# Patient Record
Sex: MALE | Race: WHITE | Employment: FULL TIME | URBAN - METROPOLITAN AREA
[De-identification: names, ages, dates, MRNs, and addresses within clinical notes are randomized per-mention and may not be internally consistent; named-entity substitution may affect disease eponyms.]

---

## 2017-02-13 DIAGNOSIS — M62.838 MUSCLE SPASM: Primary | ICD-10-CM

## 2017-02-13 RX ORDER — CYCLOBENZAPRINE HCL 10 MG
10 TABLET ORAL 2 TIMES DAILY
Qty: 20 TAB | Refills: 0 | Status: SHIPPED | OUTPATIENT
Start: 2017-02-13 | End: 2017-10-26 | Stop reason: SDUPTHER

## 2017-02-13 NOTE — TELEPHONE ENCOUNTER
He has pulled a muscle in his back 2 weeks  Ago and can't get it to let go. He feels like he needs the flexeril for a couple of days.

## 2017-02-15 RX ORDER — CYCLOBENZAPRINE HCL 5 MG
5 TABLET ORAL
Qty: 20 TAB | Refills: 0 | OUTPATIENT
Start: 2017-02-15

## 2017-02-23 DIAGNOSIS — M10.00 IDIOPATHIC GOUT, UNSPECIFIED CHRONICITY, UNSPECIFIED SITE: ICD-10-CM

## 2017-02-23 RX ORDER — FEBUXOSTAT 40 MG/1
40 TABLET, FILM COATED ORAL DAILY
Qty: 90 TAB | Refills: 2 | Status: SHIPPED | OUTPATIENT
Start: 2017-02-23 | End: 2017-04-21 | Stop reason: SDUPTHER

## 2017-04-03 DIAGNOSIS — I10 ESSENTIAL HYPERTENSION WITH GOAL BLOOD PRESSURE LESS THAN 140/90: ICD-10-CM

## 2017-04-04 RX ORDER — NEBIVOLOL 5 MG/1
5 TABLET ORAL DAILY
Qty: 90 TAB | Refills: 2 | Status: SHIPPED | OUTPATIENT
Start: 2017-04-04

## 2017-04-21 DIAGNOSIS — M10.00 IDIOPATHIC GOUT, UNSPECIFIED CHRONICITY, UNSPECIFIED SITE: ICD-10-CM

## 2017-04-21 RX ORDER — FEBUXOSTAT 40 MG/1
40 TABLET, FILM COATED ORAL DAILY
Qty: 90 TAB | Refills: 3 | Status: SHIPPED | OUTPATIENT
Start: 2017-04-21 | End: 2017-11-20 | Stop reason: SDUPTHER

## 2017-07-26 ENCOUNTER — OFFICE VISIT (OUTPATIENT)
Dept: INTERNAL MEDICINE CLINIC | Age: 42
End: 2017-07-26

## 2017-07-26 VITALS
BODY MASS INDEX: 37.67 KG/M2 | TEMPERATURE: 98.1 F | RESPIRATION RATE: 15 BRPM | SYSTOLIC BLOOD PRESSURE: 132 MMHG | HEART RATE: 81 BPM | OXYGEN SATURATION: 95 % | HEIGHT: 73 IN | WEIGHT: 284.2 LBS | DIASTOLIC BLOOD PRESSURE: 84 MMHG

## 2017-07-26 DIAGNOSIS — E66.9 OBESITY (BMI 35.0-39.9 WITHOUT COMORBIDITY): Primary | ICD-10-CM

## 2017-07-26 DIAGNOSIS — E78.1 HYPERTRIGLYCERIDEMIA: ICD-10-CM

## 2017-07-26 RX ORDER — FENOFIBRATE 145 MG/1
TABLET, COATED ORAL
Refills: 3 | COMMUNITY
Start: 2017-05-30 | End: 2017-11-22 | Stop reason: SDUPTHER

## 2017-07-26 RX ORDER — PHENTERMINE HYDROCHLORIDE 37.5 MG/1
37.5 TABLET ORAL
Qty: 30 TAB | Refills: 0 | Status: SHIPPED | OUTPATIENT
Start: 2017-07-26 | End: 2018-04-04 | Stop reason: ALTCHOICE

## 2017-07-26 NOTE — MR AVS SNAPSHOT
Visit Information Date & Time Provider Department Dept. Phone Encounter #  
 7/26/2017  7:45 AM Anisha Dutton [facility] [phone] [id number] Your Appointments 8/18/2017  7:45 AM  
Any with Anisha Dutton NP [facility]) Appt Note: EKG [facility] [id number]  
[address] [facility] [address] [facility] [address] Upcoming Health Maintenance Date Due DTaP/Tdap/Td series (1 - Tdap) 7/29/1998 INFLUENZA AGE 9 TO ADULT 8/1/2017 Allergies as of 7/26/2017  Review Complete On: 7/26/2017 By: Anisha Dutton NP Severity Noted Reaction Type Reactions Pcn [Penicillins] High 08/19/2015   Systemic Hives, Shortness of Breath, Swelling Current Immunizations  Never Reviewed No immunizations on file. Not reviewed this visit You Were Diagnosed With   
  
 Codes Comments Obesity (BMI 35.0-39.9 without comorbidity)    -  Primary ICD-10-CM: D95.9 ICD-9-CM: 278.00 Hypertriglyceridemia     ICD-10-CM: E78.1 ICD-9-CM: 272.1 Vitals BP Pulse Temp Resp Height(growth percentile) Weight(growth percentile) 132/84 (BP 1 Location: Left arm, BP Patient Position: Sitting) 81 98.1 °F (36.7 °C) (Oral) 15 6' 1\" (1.854 m) 284 lb 3.2 oz (128.9 kg) SpO2 BMI Smoking Status 95% 37.5 kg/m2 Never Smoker BMI and BSA Data Body Mass Index Body Surface Area  
 37.5 kg/m 2 2.58 m 2 Preferred Pharmacy Pharmacy Name Phone [id number] [address] [facility] [address] [facility] AT [address] [place] [address] [phone] Your Updated Medication List  
  
   
This list is accurate as of: 7/26/17  8:30 AM.  Always use your most recent med list.  
  
  
  
  
 atorvastatin 20 mg tablet Commonly known as:  LIPITOR Take 1 Tab by mouth daily. b complex vitamins tablet Take 1 Tab by mouth daily. cholecalciferol 1,000 unit Cap Commonly known as:  VITAMIN D3 Take  by mouth daily. esomeprazole 40 mg capsule Commonly known as:  Marcel Moulds Take  by mouth daily. febuxostat 40 mg Tab tablet Commonly known as:  Isiah Running Take 1 Tab by mouth daily. fenofibrate nanocrystallized 145 mg tablet Commonly known as:  TRICOR  
TAKE 1 TABLET BY ORAL ROUTE EVERY DAY  
  
 nebivolol 5 mg tablet Commonly known as:  BYSTOLIC Take 1 Tab by mouth daily. omega-3 fatty acids-vitamin e 1,000 mg Cap Take 1 Cap by mouth.  
  
 phentermine 37.5 mg tablet Commonly known as:  ADIPEX-P Take 1 Tab by mouth every morning. Max Daily Amount: 37.5 mg.  
  
 red yeast rice extract 600 mg Cap Take 600 mg by mouth now. Prescriptions Printed Refills  
 phentermine (ADIPEX-P) 37.5 mg tablet 0 Sig: Take 1 Tab by mouth every morning. Max Daily Amount: 37.5 mg.  
 Class: Print Route: Oral  
  
Introducing [facility] SERVICES! Jany Gonzalez introduces [email or web address] patient portal. Now you can access parts of your medical record, email your doctor's office, and request medication refills online. 1. In your internet browser, go to [email or web address] 2. Click on the First Time User? Click Here link in the Sign In box. You will see the New Member Sign Up page. 3. Enter your [email or web address] Access Code exactly as it appears below. You will not need to use this code after youve completed the sign-up process. If you do not sign up before the expiration date, you must request a new code. · [email or web address] Access Code: [email or web address] Expires: 10/24/2017  8:30 AM 
 
4. Enter the last four digits of your Social Security Number (xxxx) and Date of Birth (mm/dd/yyyy) as indicated and click Submit. You will be taken to the next sign-up page. 5. Create a [employer]iy[employer] ID. This will be your [employer] login ID and cannot be changed, so think of one that is secure and easy to remember. 6. Create a [employer] password. You can change your password at any time. 7. Enter your Password Reset Question and Answer. This can be used at a later time if you forget your password. 8. Enter your e-mail address. You will receive e-mail notification when new information is available in [address] 9. Click Sign Up. You can now view and download portions of your medical record. 10. Click the Download Summary menu link to download a portable copy of your medical information. If you have questions, please visit the Frequently Asked Questions section of the [employer] website. Remember, [employer] is NOT to be used for urgent needs. For medical emergencies, dial 911. Now available from your iPhone and Android! Please provide this summary of care documentation to your next provider. Your primary care clinician is listed as Merryl Bence. If you have any questions after today's visit, please call [phone].

## 2017-07-26 NOTE — PROGRESS NOTES
HISTORY OF PRESENT ILLNESS  Tiffany Greco is a 46 y.o. male here to restart phentermine. Patient Active Problem List   Diagnosis Code    Gout M10.9    Hypertriglyceridemia E78.1    Idiopathic gout M10.00     Allergies   Allergen Reactions    Pcn [Penicillins] Hives, Shortness of Breath and Swelling     Current Outpatient Prescriptions on File Prior to Visit   Medication Sig Dispense Refill    febuxostat (ULORIC) 40 mg tab tablet Take 1 Tab by mouth daily. 90 Tab 3    nebivolol (BYSTOLIC) 5 mg tablet Take 1 Tab by mouth daily. 90 Tab 2    atorvastatin (LIPITOR) 20 mg tablet Take 1 Tab by mouth daily. 90 Tab 2    esomeprazole (NEXIUM) 40 mg capsule Take  by mouth daily.  cholecalciferol (VITAMIN D3) 1,000 unit cap Take  by mouth daily.  omega-3 fatty acids-vitamin e 1,000 mg cap Take 1 Cap by mouth.  red yeast rice extract 600 mg cap Take 600 mg by mouth now.  b complex vitamins tablet Take 1 Tab by mouth daily. No current facility-administered medications on file prior to visit. Past Medical History:   Diagnosis Date    Gout     Hypertension     Tachycardia     mild tachychardia with palpitations     Past Surgical History:   Procedure Laterality Date    HX SEPTOPLASTY       Social History     Social History    Marital status: UNKNOWN     Spouse name: N/A    Number of children: N/A    Years of education: N/A     Occupational History    Not on file. Social History Main Topics    Smoking status: Never Smoker    Smokeless tobacco: Never Used    Alcohol use Yes      Comment: occationally    Drug use: No    Sexual activity: Yes     Partners: Female     Birth control/ protection: Condom     Other Topics Concern    Not on file     Social History Narrative     Family History   Problem Relation Age of Onset    Diabetes Mother     Cancer Mother     Allergy-severe Mother      severe allergy to statins     This note will not be viewable in [address]     If Narcotics or controlled substances were prescribed, I personally reviewed the patient  history. HPI   Patient with history of obesity and high cholesterol presents requesting to restart phentermine for weight loss. He was successful a year ago with the medication. Per his report, cardiology is ok with his using the medication due to the high cholesterol and weight. Denies any additional concerns. No previous side effects with medication. Review of Systems   Constitutional: Negative for fever. HENT: Negative for congestion. Eyes: Negative for blurred vision and double vision. Respiratory: Negative for cough and shortness of breath. Cardiovascular: Negative for chest pain and palpitations. Gastrointestinal: Negative for abdominal pain, constipation, diarrhea, nausea and vomiting. Skin: Negative for rash. Neurological: Negative for dizziness and headaches. Psychiatric/Behavioral: Negative for depression. The patient is not nervous/anxious. Physical Exam   Constitutional: He is oriented to person, place, and time. Vital signs are normal. He appears well-developed. He is cooperative. He does not appear ill. No distress. Patient obese. Neck: Normal range of motion. Neck supple. Cardiovascular: Normal rate, regular rhythm and normal heart sounds. Pulmonary/Chest: Effort normal and breath sounds normal. No respiratory distress. Musculoskeletal: He exhibits no edema. Neurological: He is alert and oriented to person, place, and time. Skin: Skin is warm and dry. He is not diaphoretic. Psychiatric: He has a normal mood and affect. His behavior is normal.   Nursing note and vitals reviewed. ASSESSMENT and PLAN    ICD-10-CM ICD-9-CM    1. Obesity (BMI 35.0-39.9 without comorbidity) E66.9 278.00 phentermine (ADIPEX-P) 37.5 mg tablet   2. Hypertriglyceridemia E78.1 272.1    Phentermine prescription provided to patient.  Medication benefits, risks, indication, dosage, potential side effects and alternate medication options were discussed with patient who expressed understanding. He will return in 4 weeks and we will obtain an EKG to rule out cardiac changes. If normal, will proceed with monthly refills for 3 months. Patient instructed to stop medication and report to the ER for any chest pain, palpitations, shortness of breath, syncope or visual changes. Patient expressed understanding of instructions and agreement with treatment plan.

## 2017-07-26 NOTE — PATIENT INSTRUCTIONS
Thank you for choosing [facility]. We know you have many options when it comes to your healthcare; we appreciate that you picked us. Our goal is to provide exceptional  service and world class care for every patient. You may receive a survey in the mail or by email asking for your feedback. Please take a few minutes to share your thoughts about your recent visit. Your comments helps us understand what we do well and what we can do better. To ensure confidentiality, this survey is administered by an independent third-party, [address] [place] [address] participation will help us to improve the quality of care that we provide to you, your family, friends, and neighbors. Thank you! Body Mass Index: Care Instructions  Your Care Instructions    Body mass index (BMI) can help you see if your weight is raising your risk for health problems. It uses a formula to compare how much you weigh with how tall you are. · A BMI lower than 18.5 is considered underweight. · A BMI between 18.5 and 24.9 is considered healthy. · A BMI between 25 and 29.9 is considered overweight. A BMI of 30 or higher is considered obese. If your BMI is in the normal range, it means that you have a lower risk for weight-related health problems. If your BMI is in the overweight or obese range, you may be at increased risk for weight-related health problems, such as high blood pressure, heart disease, stroke, arthritis or joint pain, and diabetes. If your BMI is in the underweight range, you may be at increased risk for health problems such as fatigue, lower protection (immunity) against illness, muscle loss, bone loss, hair loss, and hormone problems. BMI is just one measure of your risk for weight-related health problems. You may be at higher risk for health problems if you are not active, you eat an unhealthy diet, or you drink too much alcohol or use tobacco products.   Follow-up care is a key part of your treatment and safety. Be sure to make and go to all appointments, and call your doctor if you are having problems. It's also a good idea to know your test results and keep a list of the medicines you take. How can you care for yourself at home? · Practice healthy eating habits. This includes eating plenty of fruits, vegetables, whole grains, lean protein, and low-fat dairy. · If your doctor recommends it, get more exercise. Walking is a good choice. Bit by bit, increase the amount you walk every day. Try for at least 30 minutes on most days of the week. · Do not smoke. Smoking can increase your risk for health problems. If you need help quitting, talk to your doctor about stop-smoking programs and medicines. These can increase your chances of quitting for good. · Limit alcohol to 2 drinks a day for men and 1 drink a day for women. Too much alcohol can cause health problems. If you have a BMI higher than 25  · Your doctor may do other tests to check your risk for weight-related health problems. This may include measuring the distance around your waist. A waist measurement of more than 40 inches in men or 35 inches in women can increase the risk of weight-related health problems. · Talk with your doctor about steps you can take to stay healthy or improve your health. You may need to make lifestyle changes to lose weight and stay healthy, such as changing your diet and getting regular exercise. If you have a BMI lower than 18.5  · Your doctor may do other tests to check your risk for health problems. · Talk with your doctor about steps you can take to stay healthy or improve your health. You may need to make lifestyle changes to gain or maintain weight and stay healthy, such as getting more healthy foods in your diet and doing exercises to build muscle. Where can you learn more? Go to http://gila-nathaly.info/.   Enter S176 in the search box to learn more about \"Body Mass Index: Care Instructions. \"  Current as of: January 23, 2017  Content Version: 11.3  © 5997-1163 [employer]. Care instructions adapted under license by [employer] (which disclaims liability or warranty for this information). If you have questions about a medical condition or this instruction, always ask your healthcare professional. [address] any warranty or liability for your use of this information. High Cholesterol: Care Instructions  Your Care Instructions  Cholesterol is a type of fat in your blood. It is needed for many body functions, such as making new cells. Cholesterol is made by your body. It also comes from food you eat. High cholesterol means that you have too much of the fat in your blood. This raises your risk of a heart attack and stroke. LDL and HDL are part of your total cholesterol. LDL is the \"bad\" cholesterol. High LDL can raise your risk for heart disease, heart attack, and stroke. HDL is the \"good\" cholesterol. It helps clear bad cholesterol from the body. High HDL is linked with a lower risk of heart disease, heart attack, and stroke. Your cholesterol levels help your doctor find out your risk for having a heart attack or stroke. You and your doctor can talk about whether you need to lower your risk and what treatment is best for you. A heart-healthy lifestyle along with medicines can help lower your cholesterol and your risk. The way you choose to lower your risk will depend on how high your risk is for heart attack and stroke. It will also depend on how you feel about taking medicines. Follow-up care is a key part of your treatment and safety. Be sure to make and go to all appointments, and call your doctor if you are having problems. It's also a good idea to know your test results and keep a list of the medicines you take. How can you care for yourself at home? · Eat a variety of foods every day.  Good choices include fruits, vegetables, whole grains (like oatmeal), dried beans and peas, nuts and seeds, soy products (like tofu), and fat-free or low-fat dairy products. · Replace butter, margarine, and hydrogenated or partially hydrogenated oils with olive and canola oils. (Canola oil margarine without trans fat is fine.)  · Replace red meat with fish, poultry, and soy protein (like tofu). · Limit processed and packaged foods like chips, crackers, and cookies. · Bake, broil, or steam foods. Don't carrasco them. · Be physically active. Get at least 30 minutes of exercise on most days of the week. Walking is a good choice. You also may want to do other activities, such as running, swimming, cycling, or playing tennis or team sports. · Stay at a healthy weight or lose weight by making the changes in eating and physical activity listed above. Losing just a small amount of weight, even 5 to 10 pounds, can reduce your risk for having a heart attack or stroke. · Do not smoke. When should you call for help? Watch closely for changes in your health, and be sure to contact your doctor if:  · You need help making lifestyle changes. · You have questions about your medicine. Where can you learn more? Go to http://gila-nathaly.info/. Enter W895 in the search box to learn more about \"High Cholesterol: Care Instructions. \"  Current as of: April 3, 2017  Content Version: 11.3  © 5460-9485 [employer]. Care instructions adapted under license by [employer] (which disclaims liability or warranty for this information). If you have questions about a medical condition or this instruction, always ask your healthcare professional. [place] [address] any warranty or liability for your use of this information. Learning About High Cholesterol  What is high cholesterol? Cholesterol is a type of fat in your blood. It is needed for many body functions, such as making new cells.  Cholesterol is made by your body. It also comes from food you eat. If you have too much cholesterol, it starts to build up in your arteries. This is called hardening of the arteries, or atherosclerosis. High cholesterol raises your risk of a heart attack and stroke. There are different types of cholesterol. LDL is the \"bad\" cholesterol. High LDL can raise your risk for heart disease, heart attack, and stroke. HDL is the \"good\" cholesterol. High HDL is linked with a lower risk for heart disease, heart attack, and stroke. Your cholesterol levels help your doctor find out your risk for having a heart attack or stroke. How can you prevent high cholesterol? A heart-healthy lifestyle can help you prevent high cholesterol. This lifestyle helps lower your risk for a heart attack and stroke. · Eat heart-healthy foods. ¨ Eat fruits, vegetables, whole grains (like oatmeal), dried beans and peas, nuts and seeds, soy products (like tofu), and fat-free or low-fat dairy products. ¨ Replace butter, margarine, and hydrogenated or partially hydrogenated oils with olive and canola oils. (Canola oil margarine without trans fat is fine.)  ¨ Replace red meat with fish, poultry, and soy protein (like tofu). ¨ Limit processed and packaged foods like chips, crackers, and cookies. · Be active. Exercise can improve your cholesterol level. Get at least 30 minutes of exercise on most days of the week. Walking is a good choice. You also may want to do other activities, such as running, swimming, cycling, or playing tennis or team sports. · Stay at a healthy weight. Lose weight if you need to. · Don't smoke. If you need help quitting, talk to your doctor about stop-smoking programs and medicines. These can increase your chances of quitting for good. How is high cholesterol treated? The goal of treatment is to reduce your chances of having a heart attack or stroke. The goal is not to lower your cholesterol numbers only.   · You may make lifestyle changes, such as eating healthy foods, not smoking, losing weight, and being more active. · You may have to take medicine. Follow-up care is a key part of your treatment and safety. Be sure to make and go to all appointments, and call your doctor if you are having problems. It's also a good idea to know your test results and keep a list of the medicines you take. Where can you learn more? Go to http://gila-nathaly.info/. Enter E778 in the search box to learn more about \"Learning About High Cholesterol. \"  Current as of: April 3, 2017  Content Version: 11.3  © 9749-5936 [employer]. Care instructions adapted under license by [employer] (which disclaims liability or warranty for this information). If you have questions about a medical condition or this instruction, always ask your healthcare professional. [address] any warranty or liability for your use of this information. Learning About Obesity  What is obesity? Obesity means having so much body fat that your health is in danger. Having too much body fat can lead to type 2 diabetes, heart disease, high blood pressure, arthritis, sleep apnea, and stroke. Even if you don't feel bad now, think about these health risks. Do they seem like a good reason to start on a new path toward a healthier weight? Or do you have another personal, powerful reason for wanting to lose weight? Whatever it is, keep it in mind. It can be hard to change eating habits and exercise habits. But with your own reason and plan, you can do it. How do you know if your weight is in the obesity range? To know if your weight is in the obesity range, your doctor looks at your body mass index (BMI) and waist size. Your BMI is a number that is calculated from your weight and your height.  To figure your BMI for yourself, get a BMI table from your doctor or use an online tool, such as http://www.[place]/ on the [employer] of [employer]. What causes obesity? When you take in more calories than you burn off, you gain weight. How you eat, how active you are, and other things affect how your body uses calories and whether you gain weight. If you have family members who have too much body fat, you may have inherited a tendency to gain weight. And your family also helps form your eating and lifestyle habits, which can lead to obesity. Also, our busy lives make it harder to plan and cook healthy meals. For many of us, it's easier to reach for prepared foods, go out to eat, or go to the drive-through. But these foods are often high in saturated fat and calories. Portions are often too large. What can you do to reach a healthy weight? Focus on health, not diets. Diets are hard to stay on and don't work in the long run. It is very hard to stay with a diet that includes lots of big changes in your eating habits. Instead of a diet, focus on lifestyle changes that will improve your health and achieve the right balance of energy and calories. To lose weight, you need to burn more calories than you take in. You can do it by eating healthy foods in reasonable amounts and becoming more active, even a little bit every day. Making small changes over time can add up to a lot. Make a plan for change. Many people have found that naming their reasons for change and staying focused on their plan can make a big difference. Work with your doctor to create a plan that is right for you. · Ask yourself: Dhaval Romero are my personal, most powerful reasons for wanting this change? What will my life look like when I've made the change? \"  · Set your long-term goal. Make it specific, such as \"I will lose x pounds. \"  · Break your long-term goal into smaller, short-term goals. Make these small steps specific and within your reach, things you know you can do.  These steps are what keep you going from day to day. How can you stay on your plan for change? Be ready. Choose to start during a time when there are few events that might trigger slip-ups, like holidays, social events, and high-stress periods. Decide on your first few steps. Most people have more success when they make small changes, one step at a time. For example, you might switch a daily candy bar to a piece of fruit, walk 10 minutes more, or add more vegetables to a meal.  Line up your support people. Make sure you're not going to be alone as you make this change. Connect with people who understand how important it is to you. Ask family members and friends for help in keeping with your plan. And think about who could make it harder for you, and how to handle them. Try tracking. People who keep track of what they eat, feel, and do are better at losing weight. Try writing down things like:  · What and how much you eat. · How you feel before and after each meal.  · Details about each meal (like eating out or at home, eating alone, or with friends or family). · What you do to be active. Look and plan. As you track, look for patterns that you may want to change. Take note of:  · When you eat and whether you skip meals. · How often you eat out. · How many fruits and vegetables you eat. · When you eat beyond feeling full. · When and why you eat for reasons other than being hungry. When you stray from your plan, don't get upset. Figure out what made you slip up and how you can fix it. Can you take medicines or have surgery to lose weight? Before your doctor will prescribe medicines or surgery, he or she will probably want you to be more active and follow your healthy eating plan for a period of time. These habits are key lifelong changes for managing your weight, with or without other medical treatment. And these changes can help you avoid weight-related health problems.   Follow-up care is a key part of your treatment and safety. Be sure to make and go to all appointments, and call your doctor if you are having problems. It's also a good idea to know your test results and keep a list of the medicines you take. Where can you learn more? Go to http://gila-nathaly.info/. Enter N111 in the search box to learn more about \"Learning About Obesity. \"  Current as of: October 13, 2016  Content Version: 11.3  © 1427-6113 [employer]. Care instructions adapted under license by [employer] (which disclaims liability or warranty for this information). If you have questions about a medical condition or this instruction, always ask your healthcare professional. [address] any warranty or liability for your use of this information. Learning About Obesity  What is obesity? Obesity means having so much body fat that your health is in danger. Having too much body fat can lead to type 2 diabetes, heart disease, high blood pressure, arthritis, sleep apnea, and stroke. Even if you don't feel bad now, think about these health risks. Do they seem like a good reason to start on a new path toward a healthier weight? Or do you have another personal, powerful reason for wanting to lose weight? Whatever it is, keep it in mind. It can be hard to change eating habits and exercise habits. But with your own reason and plan, you can do it. How do you know if your weight is in the obesity range? To know if your weight is in the obesity range, your doctor looks at your body mass index (BMI) and waist size. Your BMI is a number that is calculated from your weight and your height. To figure your BMI for yourself, get a BMI table from your doctor or use an online tool, such as http://www.vazquez.com/ on the [employer] of [employer]. What causes obesity? When you take in more calories than you burn off, you gain weight. How you eat, how active you are, and other things affect how your body uses calories and whether you gain weight. If you have family members who have too much body fat, you may have inherited a tendency to gain weight. And your family also helps form your eating and lifestyle habits, which can lead to obesity. Also, our busy lives make it harder to plan and cook healthy meals. For many of us, it's easier to reach for prepared foods, go out to eat, or go to the drive-through. But these foods are often high in saturated fat and calories. Portions are often too large. What can you do to reach a healthy weight? Focus on health, not diets. Diets are hard to stay on and don't work in the long run. It is very hard to stay with a diet that includes lots of big changes in your eating habits. Instead of a diet, focus on lifestyle changes that will improve your health and achieve the right balance of energy and calories. To lose weight, you need to burn more calories than you take in. You can do it by eating healthy foods in reasonable amounts and becoming more active, even a little bit every day. Making small changes over time can add up to a lot. Make a plan for change. Many people have found that naming their reasons for change and staying focused on their plan can make a big difference. Work with your doctor to create a plan that is right for you. · Ask yourself: Roxanne Constantino are my personal, most powerful reasons for wanting this change? What will my life look like when I've made the change? \"  · Set your long-term goal. Make it specific, such as \"I will lose x pounds. \"  · Break your long-term goal into smaller, short-term goals. Make these small steps specific and within your reach, things you know you can do. These steps are what keep you going from day to day. How can you stay on your plan for change? Be ready.  Choose to start during a time when there are few events that might trigger slip-ups, like holidays, social events, and high-stress periods. Decide on your first few steps. Most people have more success when they make small changes, one step at a time. For example, you might switch a daily candy bar to a piece of fruit, walk 10 minutes more, or add more vegetables to a meal.  Line up your support people. Make sure you're not going to be alone as you make this change. Connect with people who understand how important it is to you. Ask family members and friends for help in keeping with your plan. And think about who could make it harder for you, and how to handle them. Try tracking. People who keep track of what they eat, feel, and do are better at losing weight. Try writing down things like:  · What and how much you eat. · How you feel before and after each meal.  · Details about each meal (like eating out or at home, eating alone, or with friends or family). · What you do to be active. Look and plan. As you track, look for patterns that you may want to change. Take note of:  · When you eat and whether you skip meals. · How often you eat out. · How many fruits and vegetables you eat. · When you eat beyond feeling full. · When and why you eat for reasons other than being hungry. When you stray from your plan, don't get upset. Figure out what made you slip up and how you can fix it. Can you take medicines or have surgery to lose weight? Before your doctor will prescribe medicines or surgery, he or she will probably want you to be more active and follow your healthy eating plan for a period of time. These habits are key lifelong changes for managing your weight, with or without other medical treatment. And these changes can help you avoid weight-related health problems. Follow-up care is a key part of your treatment and safety. Be sure to make and go to all appointments, and call your doctor if you are having problems.  It's also a good idea to know your test results and keep a list of the medicines you take.  Where can you learn more? Go to http://gila-nathaly.info/. Enter N111 in the search box to learn more about \"Learning About Obesity. \"  Current as of: October 13, 2016  Content Version: 11.3  © 4072-1781 [employer]. Care instructions adapted under license by [employer] (which disclaims liability or warranty for this information). If you have questions about a medical condition or this instruction, always ask your healthcare professional. [address] any warranty or liability for your use of this information. Starting a Weight Loss Plan: Care Instructions  Your Care Instructions  If you are thinking about losing weight, it can be hard to know where to start. Your doctor can help you set up a weight loss plan that best meets your needs. You may want to take a class on nutrition or exercise, or join a weight loss support group. If you have questions about how to make changes to your eating or exercise habits, ask your doctor about seeing a registered dietitian or an exercise specialist.  It can be a big challenge to lose weight. But you do not have to make huge changes at once. Make small changes, and stick with them. When those changes become habit, add a few more changes. If you do not think you are ready to make changes right now, try to pick a date in the future. Make an appointment to see your doctor to discuss whether the time is right for you to start a plan. Follow-up care is a key part of your treatment and safety. Be sure to make and go to all appointments, and call your doctor if you are having problems. Its also a good idea to know your test results and keep a list of the medicines you take. How can you care for yourself at home? · Set realistic goals. Many people expect to lose much more weight than is likely. A weight loss of 5% to 10% of your body weight may be enough to improve your health.   · Get family and friends involved to provide support. Talk to them about why you are trying to lose weight, and ask them to help. They can help by participating in exercise and having meals with you, even if they may be eating something different. · Find what works best for you. If you do not have time or do not like to cook, a program that offers meal replacement bars or shakes may be better for you. Or if you like to prepare meals, finding a plan that includes daily menus and recipes may be best.  · Ask your doctor about other health professionals who can help you achieve your weight loss goals. ¨ A dietitian can help you make healthy changes in your diet. ¨ An exercise specialist or  can help you develop a safe and effective exercise program.  ¨ A counselor or psychiatrist can help you cope with issues such as depression, anxiety, or family problems that can make it hard to focus on weight loss. · Consider joining a support group for people who are trying to lose weight. Your doctor can suggest groups in your area. Where can you learn more? Go to http://gila-nathaly.info/. Enter M456 in the search box to learn more about \"Starting a Weight Loss Plan: Care Instructions. \"  Current as of: October 13, 2016  Content Version: 11.3  © 3592-4945 [employer]. Care instructions adapted under license by [employer] (which disclaims liability or warranty for this information). If you have questions about a medical condition or this instruction, always ask your healthcare professional. [place] [address] any warranty or liability for your use of this information.

## 2017-10-26 DIAGNOSIS — M62.838 MUSCLE SPASM: ICD-10-CM

## 2017-10-26 RX ORDER — CYCLOBENZAPRINE HCL 10 MG
10 TABLET ORAL
Status: CANCELLED | OUTPATIENT
Start: 2017-10-26

## 2017-10-27 RX ORDER — CYCLOBENZAPRINE HCL 10 MG
10 TABLET ORAL 2 TIMES DAILY
Qty: 20 TAB | Refills: 0 | Status: SHIPPED | OUTPATIENT
Start: 2017-10-27 | End: 2017-11-06

## 2017-11-20 DIAGNOSIS — M10.00 IDIOPATHIC GOUT, UNSPECIFIED CHRONICITY, UNSPECIFIED SITE: ICD-10-CM

## 2017-11-20 RX ORDER — FEBUXOSTAT 40 MG/1
40 TABLET, FILM COATED ORAL DAILY
Qty: 90 TAB | Refills: 0 | Status: SHIPPED | OUTPATIENT
Start: 2017-11-20 | End: 2018-02-26 | Stop reason: SDUPTHER

## 2017-11-20 RX ORDER — FENOFIBRATE 145 MG/1
TABLET, COATED ORAL
Refills: 3 | Status: CANCELLED | OUTPATIENT
Start: 2017-11-20

## 2017-11-20 NOTE — TELEPHONE ENCOUNTER
Last refill uloric 4/21/17  Last refill tricor 5/30/17  Last office visit 7/26/17  Last labs 8/2/16 8/12/16

## 2017-11-22 RX ORDER — FENOFIBRATE 145 MG/1
TABLET, COATED ORAL
Qty: 90 TAB | Refills: 0 | Status: SHIPPED | OUTPATIENT
Start: 2017-11-22

## 2018-02-12 LAB — LDL-C, EXTERNAL: 75

## 2018-04-04 ENCOUNTER — OFFICE VISIT (OUTPATIENT)
Dept: INTERNAL MEDICINE CLINIC | Age: 43
End: 2018-04-04

## 2018-04-04 VITALS
DIASTOLIC BLOOD PRESSURE: 88 MMHG | HEART RATE: 75 BPM | RESPIRATION RATE: 18 BRPM | OXYGEN SATURATION: 98 % | TEMPERATURE: 98.1 F | WEIGHT: 283 LBS | SYSTOLIC BLOOD PRESSURE: 136 MMHG | BODY MASS INDEX: 37.51 KG/M2 | HEIGHT: 73 IN

## 2018-04-04 DIAGNOSIS — E78.1 HYPERTRIGLYCERIDEMIA: Primary | ICD-10-CM

## 2018-04-04 DIAGNOSIS — R74.8 ELEVATED LIVER ENZYMES: ICD-10-CM

## 2018-04-04 DIAGNOSIS — E66.9 OBESITY (BMI 30-39.9): ICD-10-CM

## 2018-04-04 DIAGNOSIS — R73.02 IGT (IMPAIRED GLUCOSE TOLERANCE): ICD-10-CM

## 2018-04-04 DIAGNOSIS — M10.00 IDIOPATHIC GOUT, UNSPECIFIED CHRONICITY, UNSPECIFIED SITE: ICD-10-CM

## 2018-04-04 DIAGNOSIS — I10 ESSENTIAL HYPERTENSION: ICD-10-CM

## 2018-04-04 RX ORDER — FEBUXOSTAT 40 MG/1
40 TABLET, FILM COATED ORAL DAILY
COMMUNITY
End: 2018-04-09 | Stop reason: SDUPTHER

## 2018-04-04 NOTE — LETTER
April 4, 2018 Vera Riley [address] [place] [id number] Dear Elly Prieto: Thank you for requesting access to Piazza. Please follow the instructions below to securely access and download your online medical record. Piazza allows you to send messages to your doctor, view your test results, renew your prescriptions, schedule appointments, and more. How Do I Sign Up? 1. In your internet browser, go to [email or web address]  
2. Click on the First Time User? Click Here link in the Sign In box. You will be redirected to the New Member Sign Up page. 3. Enter your Piazza Access Code exactly as it appears below. You will not need to use this code after youve completed the sign-up process. If you do not sign up before the expiration date, you must request a new code. Piazza Access Code: [email or web address] Expires: 7/3/2018  4:48 PM  
 
4. Enter the last four digits of your Social Security Number (xxxx) and Date of Birth (mm/dd/yyyy) as indicated and click Submit. You will be taken to the next sign-up page. 5. Create a Piazza ID. This will be your Piazza login ID and cannot be changed, so think of one that is secure and easy to remember. 6. Create a Piazza password. You can change your password at any time. 7. Enter your Password Reset Question and Answer. This can be used at a later time if you forget your password. 8. Enter your e-mail address. You will receive e-mail notification when new information is available in [address]. 9. Click Sign Up. You can now view and download portions of your medical record. 10. Click the Download Summary menu link to download a portable copy of your medical information. Additional Information If you have questions, please visit the Frequently Asked Questions section of the Piazza website at [email or web address] Remember, Piazza is NOT to be used for urgent needs. For medical emergencies, dial 911. Now available from your iPhone and Android! Sincerely, Trina Beltran

## 2018-04-04 NOTE — PROGRESS NOTES
Written by Maddie Hamilton, as dictated by Dr. Melanie Quintana MD.    Idella Libman is a 38 y.o. male. HPI  The patient comes in today for a follow-up. He had labs drawn at Dr. Heike Peña (cardio) office in 01/2018. His triglycerides were still high at 254, but he states it has improved significantly as the last time he had labs drawn his triglycerides were so high they were unable to be quantified. His VLDL was also elevated at 50. He has been taking Lipitor and fenofibrate and has been following an intermittent fasting diet which he feels is a diet he can sustain. His AST and ALT at the same time were elevated at 55 and 110, respectively. He took phentermine in the past, but stopped because he started to experience palpitations, which persisted after he stopped the medication but did resolve. He has signed up for a [employer] class 3 times per week. His BP is elevated at 144/90, rechecked at 136/88. He is compliant on Uloric. He experienced a gout attack when he missed his medication and drank more red wine than usual, and was treated with a steroid taper. He did not get a flu shot this season. He does not recall when he last had his Tdap. Patient Active Problem List   Diagnosis Code    Hypertriglyceridemia E78.1    Idiopathic gout M10.00    Obesity (BMI 30-39. 9) E66.9    Essential hypertension I10        Current Outpatient Prescriptions on File Prior to Visit   Medication Sig Dispense Refill    fenofibrate nanocrystallized (TRICOR) 145 mg tablet TAKE 1 TABLET BY ORAL ROUTE EVERY DAY 90 Tab 0    nebivolol (BYSTOLIC) 5 mg tablet Take 1 Tab by mouth daily. 90 Tab 2    atorvastatin (LIPITOR) 20 mg tablet Take 1 Tab by mouth daily. 90 Tab 2    esomeprazole (NEXIUM) 40 mg capsule Take  by mouth daily.  omega-3 fatty acids-vitamin e 1,000 mg cap Take 1 Cap by mouth.       cholecalciferol (VITAMIN D3) 1,000 unit cap Take  by mouth daily.  b complex vitamins tablet Take 1 Tab by mouth daily. No current facility-administered medications on file prior to visit. Allergies   Allergen Reactions    Pcn [Penicillins] Hives, Shortness of Breath and Swelling       Past Medical History:   Diagnosis Date    Gout     Hypertension     Tachycardia     mild tachychardia with palpitations       Past Surgical History:   Procedure Laterality Date    HX SEPTOPLASTY         Family History   Problem Relation Age of Onset    Diabetes Mother     Cancer Mother     Allergy-severe Mother      severe allergy to statins       Social History     Social History    Marital status: UNKNOWN     Spouse name: N/A    Number of children: N/A    Years of education: N/A     Occupational History    Not on file. Social History Main Topics    Smoking status: Never Smoker    Smokeless tobacco: Never Used    Alcohol use Yes      Comment: occationally    Drug use: No    Sexual activity: Yes     Partners: Female     Birth control/ protection: Condom     Other Topics Concern    Not on file     Social History Narrative       Review of Systems   Constitutional: Negative for malaise/fatigue. HENT: Negative for congestion. Respiratory: Negative for cough and shortness of breath. Cardiovascular: Negative for chest pain and palpitations. Musculoskeletal: Negative for joint pain and myalgias. Neurological: Negative for weakness. Psychiatric/Behavioral: Negative for depression, memory loss and substance abuse. Visit Vitals    /88 (BP 1 Location: Right arm, BP Patient Position: Sitting)    Pulse 75    Temp 98.1 °F (36.7 °C) (Oral)    Resp 18    Ht 6' 1\" (1.854 m)    Wt 283 lb (128.4 kg)    SpO2 98%    BMI 37.34 kg/m2       Physical Exam   Constitutional: He is oriented to person, place, and time. He appears well-developed. No distress.    Obese   HENT:   Right Ear: External ear normal.   Left Ear: External ear normal. Eyes: Conjunctivae and EOM are normal. Right eye exhibits no discharge. Left eye exhibits no discharge. Neck: Normal range of motion. Neck supple. Cardiovascular: Normal rate and regular rhythm. Pulmonary/Chest: Effort normal and breath sounds normal. He has no wheezes. Abdominal: Soft. Bowel sounds are normal. There is no tenderness. Lymphadenopathy:     He has no cervical adenopathy. Neurological: He is alert and oriented to person, place, and time. Skin: He is not diaphoretic. Psychiatric: He has a normal mood and affect. His behavior is normal.   Nursing note and vitals reviewed. ASSESSMENT and PLAN    ICD-10-CM ICD-9-CM    1. Hypertriglyceridemia E78.1 272.1 Compliant on Lipitor and fenofibrate. 2. Essential hypertension I10 401.9 CBC W/O DIFF      TSH 3RD GENERATION    BP is well-controlled on current medication. No change to dosage at this time. 3. Obesity (BMI 30-39. 9) E66.9 278.00 Discussed that he should add in some exercise on the days that he is not at the gym. Also reiterated that weight loss is very important for the health of his liver. 4. Idiopathic gout, unspecified chronicity, unspecified site M10.00 274.9 URIC ACID    Will check uric acid levels today. 5. Elevated liver enzymes R74.8 790.5 US ABD COMP      METABOLIC PANEL, COMPREHENSIVE    Abdominal US ordered. 6. IGT (impaired glucose tolerance) R73.02 790.22 HEMOGLOBIN A1C WITH EAG    Will repeat HA1c today. This plan was reviewed with the patient and patient agrees. All questions were answered. This scribe documentation was reviewed by me and accurately reflects the examination and decisions made by me. This note will not be viewable in [address]

## 2018-04-04 NOTE — MR AVS SNAPSHOT
[address] [facility] [address] [place] [address] [facility] 
[phone] Patient: Mari Walters MRN: [id number] :1975 Visit Information Date & Time Provider Department Dept. Phone Encounter #  
 2018  3:45 PM Lacey Galan MD [facility] Internal Medicine [phone] [id number] Upcoming Health Maintenance Date Due DTaP/Tdap/Td series (1 - Tdap) 1998 Influenza Age 5 to Adult 2017 Allergies as of 2018  Review Complete On: 2018 By: Lacey Galan MD  
  
 Severity Noted Reaction Type Reactions Pcn [Penicillins] High 2015   Systemic Hives, Shortness of Breath, Swelling Current Immunizations  Never Reviewed No immunizations on file. Not reviewed this visit You Were Diagnosed With   
  
 Codes Comments Hypertriglyceridemia    -  Primary ICD-10-CM: E78.1 ICD-9-CM: 272.1 Essential hypertension     ICD-10-CM: I10 
ICD-9-CM: 401.9 Obesity (BMI 30-39. 9)     ICD-10-CM: E66.9 ICD-9-CM: 278.00 Idiopathic gout, unspecified chronicity, unspecified site     ICD-10-CM: M10.00 ICD-9-CM: 274.9 Elevated liver enzymes     ICD-10-CM: R74.8 ICD-9-CM: 790.5 IGT (impaired glucose tolerance)     ICD-10-CM: R73.02 
ICD-9-CM: 790.22 Vitals BP Pulse Temp Resp Height(growth percentile) Weight(growth percentile) 136/88 (BP 1 Location: Right arm, BP Patient Position: Sitting) 75 98.1 °F (36.7 °C) (Oral) 18 6' 1\" (1.854 m) 283 lb (128.4 kg) SpO2 BMI Smoking Status 98% 37.34 kg/m2 Never Smoker Vitals History BMI and BSA Data Body Mass Index Body Surface Area  
 37.34 kg/m 2 2.57 m 2 Preferred Pharmacy Pharmacy Name Phone [id number], [address] [phone] Your Updated Medication List  
  
   
This list is accurate as of 18  4:48 PM.  Always use your most recent med list.  
  
  
  
  
 atorvastatin 20 mg tablet Commonly known as:  LIPITOR Take 1 Tab by mouth daily. b complex vitamins tablet Take 1 Tab by mouth daily. cholecalciferol 1,000 unit Cap Commonly known as:  VITAMIN D3 Take  by mouth daily. esomeprazole 40 mg capsule Commonly known as:  Union  Take  by mouth daily. fenofibrate nanocrystallized 145 mg tablet Commonly known as:  TRICOR  
TAKE 1 TABLET BY ORAL ROUTE EVERY DAY  
  
 nebivolol 5 mg tablet Commonly known as:  BYSTOLIC Take 1 Tab by mouth daily. omega-3 fatty acids-vitamin e 1,000 mg Cap Take 1 Cap by mouth. ULORIC 40 mg Tab tablet Generic drug:  febuxostat Take 40 mg by mouth daily. We Performed the Following CBC W/O DIFF [58556 CPT(R)] HEMOGLOBIN A1C WITH EAG [15626 CPT(R)] METABOLIC PANEL, COMPREHENSIVE [51790 CPT(R)] TSH 3RD GENERATION [44948 CPT(R)] URIC ACID [id number] CPT(R)] To-Do List   
 04/04/2018 Imaging:  US ABD COMP Introducing [facility]! Barbara Scott introduces CIQUAL patient portal. Now you can access parts of your medical record, email your doctor's office, and request medication refills online. 1. In your internet browser, go to [email or web address] 2. Click on the First Time User? Click Here link in the Sign In box. You will see the New Member Sign Up page. 3. Enter your CIQUAL Access Code exactly as it appears below. You will not need to use this code after youve completed the sign-up process. If you do not sign up before the expiration date, you must request a new code. · CIQUAL Access Code: [email or web address] Expires: 7/3/2018  4:48 PM 
 
4. Enter the last four digits of your Social Security Number (xxxx) and Date of Birth (mm/dd/yyyy) as indicated and click Submit. You will be taken to the next sign-up page. 5. Create a CIQUAL ID.  This will be your CIQUAL login ID and cannot be changed, so think of one that is secure and easy to remember. 6. Create a [employer] password. You can change your password at any time. 7. Enter your Password Reset Question and Answer. This can be used at a later time if you forget your password. 8. Enter your e-mail address. You will receive e-mail notification when new information is available in [address] 9. Click Sign Up. You can now view and download portions of your medical record. 10. Click the Download Summary menu link to download a portable copy of your medical information. If you have questions, please visit the Frequently Asked Questions section of the [employer] website. Remember, [employer] is NOT to be used for urgent needs. For medical emergencies, dial 911. Now available from your iPhone and Android! Please provide this summary of care documentation to your next provider. Your primary care clinician is listed as Kiara Tsang. If you have any questions after today's visit, please call [phone].

## 2018-04-04 NOTE — PROGRESS NOTES
Mari Walters is a 38 y.o. male    Chief Complaint   Patient presents with    Medication Evaluation       1. Have you been to the ER, urgent care clinic since your last visit? Hospitalized since your last visit? no    2. Have you seen or consulted any other health care providers outside of the [facility] since your last visit? Include any pap smears or colon screening.   Yes, [facility] on 3/31/18 for sample packs of the Uloric    Visit Vitals    /90 (BP 1 Location: Right arm, BP Patient Position: Sitting)    Pulse 75    Temp 98.1 °F (36.7 °C) (Oral)    Resp 18    Ht 6' 1\" (1.854 m)    Wt 283 lb (128.4 kg)    SpO2 98%    BMI 37.34 kg/m2

## 2018-04-05 LAB
ALBUMIN SERPL-MCNC: 4.7 G/DL (ref 3.5–5.5)
ALBUMIN/GLOB SERPL: 1.7 {RATIO} (ref 1.2–2.2)
ALP SERPL-CCNC: 44 IU/L (ref 39–117)
ALT SERPL-CCNC: 54 IU/L (ref 0–44)
AST SERPL-CCNC: 30 IU/L (ref 0–40)
BILIRUB SERPL-MCNC: 0.4 MG/DL (ref 0–1.2)
BUN SERPL-MCNC: 17 MG/DL (ref 6–24)
BUN/CREAT SERPL: 14 (ref 9–20)
CALCIUM SERPL-MCNC: 9.5 MG/DL (ref 8.7–10.2)
CHLORIDE SERPL-SCNC: 104 MMOL/L (ref 96–106)
CO2 SERPL-SCNC: 22 MMOL/L (ref 18–29)
CREAT SERPL-MCNC: 1.19 MG/DL (ref 0.76–1.27)
ERYTHROCYTE [DISTWIDTH] IN BLOOD BY AUTOMATED COUNT: 12.7 % (ref 12.3–15.4)
EST. AVERAGE GLUCOSE BLD GHB EST-MCNC: 108 MG/DL
GFR SERPLBLD CREATININE-BSD FMLA CKD-EPI: 76 ML/MIN/1.73
GFR SERPLBLD CREATININE-BSD FMLA CKD-EPI: 88 ML/MIN/1.73
GLOBULIN SER CALC-MCNC: 2.7 G/DL (ref 1.5–4.5)
GLUCOSE SERPL-MCNC: 63 MG/DL (ref 65–99)
HBA1C MFR BLD: 5.4 % (ref 4.8–5.6)
HCT VFR BLD AUTO: 39.6 % (ref 37.5–51)
HGB BLD-MCNC: 13.6 G/DL (ref 13–17.7)
MCH RBC QN AUTO: 31.9 PG (ref 26.6–33)
MCHC RBC AUTO-ENTMCNC: 34.3 G/DL (ref 31.5–35.7)
MCV RBC AUTO: 93 FL (ref 79–97)
PLATELET # BLD AUTO: 259 X10E3/UL (ref 150–379)
POTASSIUM SERPL-SCNC: 4.3 MMOL/L (ref 3.5–5.2)
PROT SERPL-MCNC: 7.4 G/DL (ref 6–8.5)
RBC # BLD AUTO: 4.27 X10E6/UL (ref 4.14–5.8)
SODIUM SERPL-SCNC: 143 MMOL/L (ref 134–144)
TSH SERPL DL<=0.005 MIU/L-ACNC: 1.37 UIU/ML (ref 0.45–4.5)
URATE SERPL-MCNC: 4.7 MG/DL (ref 3.7–8.6)
WBC # BLD AUTO: 5.9 X10E3/UL (ref 3.4–10.8)

## 2018-04-05 NOTE — PROGRESS NOTES
Please let him know liver enzymes are improving but still has to go for US. Uric acid levels came in normal range.

## 2018-10-24 RX ORDER — FEBUXOSTAT 40 MG/1
TABLET ORAL
Qty: 90 TAB | Refills: 0 | Status: SHIPPED | OUTPATIENT
Start: 2018-10-24 | End: 2019-01-25 | Stop reason: SDUPTHER

## 2018-11-14 ENCOUNTER — APPOINTMENT (OUTPATIENT)
Dept: CT IMAGING | Age: 43
End: 2018-11-14
Attending: PHYSICIAN ASSISTANT
Payer: COMMERCIAL

## 2018-11-14 ENCOUNTER — APPOINTMENT (OUTPATIENT)
Dept: GENERAL RADIOLOGY | Age: 43
End: 2018-11-14
Attending: PHYSICIAN ASSISTANT
Payer: COMMERCIAL

## 2018-11-14 ENCOUNTER — OFFICE VISIT (OUTPATIENT)
Dept: PRIMARY CARE CLINIC | Age: 43
End: 2018-11-14

## 2018-11-14 ENCOUNTER — HOSPITAL ENCOUNTER (OUTPATIENT)
Age: 43
Setting detail: OBSERVATION
Discharge: HOME | End: 2018-11-15
Attending: EMERGENCY MEDICINE | Admitting: FAMILY MEDICINE
Payer: COMMERCIAL

## 2018-11-14 VITALS
TEMPERATURE: 98.1 F | OXYGEN SATURATION: 97 % | WEIGHT: 270 LBS | BODY MASS INDEX: 35.78 KG/M2 | RESPIRATION RATE: 16 BRPM | DIASTOLIC BLOOD PRESSURE: 82 MMHG | SYSTOLIC BLOOD PRESSURE: 138 MMHG | HEIGHT: 73 IN | HEART RATE: 79 BPM

## 2018-11-14 DIAGNOSIS — R31.29 HEMATURIA, MICROSCOPIC: Primary | ICD-10-CM

## 2018-11-14 DIAGNOSIS — R77.8 ELEVATED TROPONIN: Primary | ICD-10-CM

## 2018-11-14 DIAGNOSIS — R35.0 URINE FREQUENCY: ICD-10-CM

## 2018-11-14 DIAGNOSIS — M54.50 ACUTE MIDLINE LOW BACK PAIN WITHOUT SCIATICA: ICD-10-CM

## 2018-11-14 DIAGNOSIS — N20.0 RIGHT KIDNEY STONE: ICD-10-CM

## 2018-11-14 DIAGNOSIS — J02.9 SORE THROAT: ICD-10-CM

## 2018-11-14 PROBLEM — N20.1 RIGHT URETERAL STONE: Status: ACTIVE | Noted: 2018-11-14

## 2018-11-14 PROBLEM — E66.01 SEVERE OBESITY (HCC): Status: ACTIVE | Noted: 2018-11-14

## 2018-11-14 PROBLEM — N13.30 HYDRONEPHROSIS OF RIGHT KIDNEY: Status: ACTIVE | Noted: 2018-11-14

## 2018-11-14 PROBLEM — R07.9 ACUTE CHEST PAIN: Status: ACTIVE | Noted: 2018-11-14

## 2018-11-14 LAB
ALBUMIN SERPL-MCNC: 4.2 G/DL (ref 3.5–5)
ALBUMIN/GLOB SERPL: 1.1 {RATIO} (ref 1.1–2.2)
ALP SERPL-CCNC: 44 U/L (ref 45–117)
ALT SERPL-CCNC: 56 U/L (ref 12–78)
ANION GAP SERPL CALC-SCNC: 9 MMOL/L (ref 5–15)
APPEARANCE UR: ABNORMAL
APTT PPP: 27.7 SEC (ref 22.1–32)
AST SERPL-CCNC: 24 U/L (ref 15–37)
BACTERIA URNS QL MICRO: NEGATIVE /HPF
BASOPHILS # BLD: 0.1 K/UL (ref 0–0.1)
BASOPHILS NFR BLD: 1 % (ref 0–1)
BILIRUB SERPL-MCNC: 0.3 MG/DL (ref 0.2–1)
BILIRUB UR QL: NEGATIVE
BUN SERPL-MCNC: 15 MG/DL (ref 6–20)
BUN/CREAT SERPL: 12 (ref 12–20)
CALCIUM SERPL-MCNC: 9.4 MG/DL (ref 8.5–10.1)
CHLORIDE SERPL-SCNC: 106 MMOL/L (ref 97–108)
CK SERPL-CCNC: 200 U/L (ref 39–308)
CO2 SERPL-SCNC: 24 MMOL/L (ref 21–32)
COLOR UR: ABNORMAL
CREAT SERPL-MCNC: 1.25 MG/DL (ref 0.7–1.3)
DIFFERENTIAL METHOD BLD: NORMAL
EOSINOPHIL # BLD: 0.2 K/UL (ref 0–0.4)
EOSINOPHIL NFR BLD: 2 % (ref 0–7)
EPITH CASTS URNS QL MICRO: ABNORMAL /LPF
ERYTHROCYTE [DISTWIDTH] IN BLOOD BY AUTOMATED COUNT: 11.7 % (ref 11.5–14.5)
GLOBULIN SER CALC-MCNC: 4 G/DL (ref 2–4)
GLUCOSE SERPL-MCNC: 78 MG/DL (ref 65–100)
GLUCOSE UR STRIP.AUTO-MCNC: NEGATIVE MG/DL
HCT VFR BLD AUTO: 38.5 % (ref 36.6–50.3)
HGB BLD-MCNC: 13.7 G/DL (ref 12.1–17)
HGB UR QL STRIP: ABNORMAL
HYALINE CASTS URNS QL MICRO: ABNORMAL /LPF (ref 0–5)
IMM GRANULOCYTES # BLD: 0 K/UL (ref 0–0.04)
IMM GRANULOCYTES NFR BLD AUTO: 0 % (ref 0–0.5)
INR PPP: 1.1 (ref 0.9–1.1)
KETONES UR QL STRIP.AUTO: NEGATIVE MG/DL
LEUKOCYTE ESTERASE UR QL STRIP.AUTO: NEGATIVE
LIPASE SERPL-CCNC: 343 U/L (ref 73–393)
LYMPHOCYTES # BLD: 2.6 K/UL (ref 0.8–3.5)
LYMPHOCYTES NFR BLD: 35 % (ref 12–49)
MCH RBC QN AUTO: 32.6 PG (ref 26–34)
MCHC RBC AUTO-ENTMCNC: 35.6 G/DL (ref 30–36.5)
MCV RBC AUTO: 91.7 FL (ref 80–99)
MONOCYTES # BLD: 0.8 K/UL (ref 0–1)
MONOCYTES NFR BLD: 11 % (ref 5–13)
NEUTS SEG # BLD: 3.8 K/UL (ref 1.8–8)
NEUTS SEG NFR BLD: 51 % (ref 32–75)
NITRITE UR QL STRIP.AUTO: NEGATIVE
NRBC # BLD: 0 K/UL (ref 0–0.01)
NRBC BLD-RTO: 0 PER 100 WBC
PH UR STRIP: 5.5 [PH] (ref 5–8)
PLATELET # BLD AUTO: 207 K/UL (ref 150–400)
PMV BLD AUTO: 9.8 FL (ref 8.9–12.9)
POTASSIUM SERPL-SCNC: 3.5 MMOL/L (ref 3.5–5.1)
PROT SERPL-MCNC: 8.2 G/DL (ref 6.4–8.2)
PROT UR STRIP-MCNC: NEGATIVE MG/DL
PROTHROMBIN TIME: 10.8 SEC (ref 9–11.1)
RBC # BLD AUTO: 4.2 M/UL (ref 4.1–5.7)
RBC #/AREA URNS HPF: >100 /HPF (ref 0–5)
S PYO AG THROAT QL: NEGATIVE
SODIUM SERPL-SCNC: 139 MMOL/L (ref 136–145)
SP GR UR REFRACTOMETRY: 1.02 (ref 1–1.03)
THERAPEUTIC RANGE,PTTT: NORMAL SECS (ref 58–77)
TROPONIN I SERPL-MCNC: 0.12 NG/ML
UR CULT HOLD, URHOLD: NORMAL
UROBILINOGEN UR QL STRIP.AUTO: 1 EU/DL (ref 0.2–1)
WBC # BLD AUTO: 7.4 K/UL (ref 4.1–11.1)
WBC URNS QL MICRO: ABNORMAL /HPF (ref 0–4)

## 2018-11-14 PROCEDURE — 83690 ASSAY OF LIPASE: CPT

## 2018-11-14 PROCEDURE — 85025 COMPLETE CBC W/AUTO DIFF WBC: CPT

## 2018-11-14 PROCEDURE — 74011250636 HC RX REV CODE- 250/636: Performed by: PHYSICIAN ASSISTANT

## 2018-11-14 PROCEDURE — 99284 EMERGENCY DEPT VISIT MOD MDM: CPT

## 2018-11-14 PROCEDURE — 74176 CT ABD & PELVIS W/O CONTRAST: CPT

## 2018-11-14 PROCEDURE — 96361 HYDRATE IV INFUSION ADD-ON: CPT

## 2018-11-14 PROCEDURE — 85730 THROMBOPLASTIN TIME PARTIAL: CPT

## 2018-11-14 PROCEDURE — 82550 ASSAY OF CK (CPK): CPT

## 2018-11-14 PROCEDURE — 81001 URINALYSIS AUTO W/SCOPE: CPT

## 2018-11-14 PROCEDURE — 84484 ASSAY OF TROPONIN QUANT: CPT

## 2018-11-14 PROCEDURE — 96374 THER/PROPH/DIAG INJ IV PUSH: CPT

## 2018-11-14 PROCEDURE — 71046 X-RAY EXAM CHEST 2 VIEWS: CPT

## 2018-11-14 PROCEDURE — 87070 CULTURE OTHR SPECIMN AEROBIC: CPT

## 2018-11-14 PROCEDURE — 74011250637 HC RX REV CODE- 250/637: Performed by: PHYSICIAN ASSISTANT

## 2018-11-14 PROCEDURE — 65660000000 HC RM CCU STEPDOWN

## 2018-11-14 PROCEDURE — 36415 COLL VENOUS BLD VENIPUNCTURE: CPT

## 2018-11-14 PROCEDURE — 80053 COMPREHEN METABOLIC PANEL: CPT

## 2018-11-14 PROCEDURE — 99218 HC RM OBSERVATION: CPT

## 2018-11-14 PROCEDURE — 87880 STREP A ASSAY W/OPTIC: CPT

## 2018-11-14 PROCEDURE — 85610 PROTHROMBIN TIME: CPT

## 2018-11-14 PROCEDURE — 93005 ELECTROCARDIOGRAM TRACING: CPT

## 2018-11-14 RX ORDER — CYCLOBENZAPRINE HCL 10 MG
10 TABLET ORAL
Qty: 15 TAB | Refills: 0 | Status: SHIPPED | OUTPATIENT
Start: 2018-11-14 | End: 2018-12-05 | Stop reason: SDUPTHER

## 2018-11-14 RX ORDER — KETOROLAC TROMETHAMINE 30 MG/ML
30 INJECTION, SOLUTION INTRAMUSCULAR; INTRAVENOUS
Status: COMPLETED | OUTPATIENT
Start: 2018-11-14 | End: 2018-11-14

## 2018-11-14 RX ORDER — TAMSULOSIN HYDROCHLORIDE 0.4 MG/1
0.4 CAPSULE ORAL
Status: COMPLETED | OUTPATIENT
Start: 2018-11-14 | End: 2018-11-14

## 2018-11-14 RX ADMIN — SODIUM CHLORIDE 1000 ML: 900 INJECTION, SOLUTION INTRAVENOUS at 21:48

## 2018-11-14 RX ADMIN — KETOROLAC TROMETHAMINE 30 MG: 30 INJECTION, SOLUTION INTRAMUSCULAR at 22:13

## 2018-11-14 RX ADMIN — TAMSULOSIN HYDROCHLORIDE 0.4 MG: 0.4 CAPSULE ORAL at 22:13

## 2018-11-14 NOTE — LETTER
NOTIFICATION RETURN TO WORK 
 
11/14/2018 2:54 PM 
 
Mr. Eduardo Mendoza [place] [address] To Whom It May Concern: 
 
Eduardo Mendoza is currently under the care of Bob Merrill. He was seen in the office today for back pain. Medications given and rest recommended. He will return to work/school on: 11/19/2018 If there are questions or concerns please have the patient contact our office. Sincerely, Yrn Taveras MD

## 2018-11-14 NOTE — PROGRESS NOTES
Chief Complaint Patient presents with  
 Headache  
  patient had a fall, and states that he fell on his back four feet off of the ground. states that since does not feel right. patient states that last night up often to urinate and states that the headaches were there at the same time. patient states that he has lower back pain bilaterally

## 2018-11-14 NOTE — PROGRESS NOTES
Written by Dallas Connor, as dictated by Dr. Greer Nageotte, MD. 
 
[address] [facility] Jesica Gregory is a 41 y.o. male. HPI The patient presents today c/o BL lower mid back pain, which he describes as near the kidneys. Patient notes that his back feels better when pressure is applied. He was urinating frequently last night and his urine is darker than normal for the past few days. The patient experiences palpitations when he wakes up at night to urinate. Patient notes that he has been constipated. The patient notes that he fell on his back about 7 days ago from a height of about 3 feet. Pain is worse on the R side and patient notes he fell more on his R side than his L. Since falling he has been feeling fatigued and \"mentally off. \" Patient notes that he has been feeling better after taking off of work today and sleeping. The patient notes he has been drinking plenty of water and denies dysuria. Denies hx of kidney stones. Patient notes that he has been taking Janeth seltzer and he has not been taking ibuprofen. UA performed in office found trace blood. He has also been experiencing recurring \"explosive\" headache and sore throat for about 36 hours, but his sore throat has improved some today. The patient notes that he had dry, brown mucus last night and his  has been sick. BP is high today at 132/91, 138/82 on repeat. He does not check his BP at home, but notes that he has cardiology nurses check it sometimes. He weighs 270 lbs today and has lost weight from 283 lbs in 04/2018. Patient has not received a flu shot and notes that he would need documentation for his flu shot. Patient Active Problem List  
Diagnosis Code  Hypertriglyceridemia E78.1  Idiopathic gout M10.00  Obesity (BMI 30-39. 9) E66.9  
 Essential hypertension I10  Severe obesity (HCC) E66.01  
  
 
Current Outpatient Medications on File Prior to Visit Medication Sig Dispense Refill  ULORIC 40 mg tab tablet TAKE 1 TABLET BY MOUTH EVERY DAY 90 Tab 0  
 fenofibrate nanocrystallized (TRICOR) 145 mg tablet TAKE 1 TABLET BY ORAL ROUTE EVERY DAY 90 Tab 0  
 nebivolol (BYSTOLIC) 5 mg tablet Take 1 Tab by mouth daily. 90 Tab 2  
 atorvastatin (LIPITOR) 20 mg tablet Take 1 Tab by mouth daily. 90 Tab 2  
 esomeprazole (NEXIUM) 40 mg capsule Take  by mouth daily.  omega-3 fatty acids-vitamin e 1,000 mg cap Take 1 Cap by mouth.  cholecalciferol (VITAMIN D3) 1,000 unit cap Take  by mouth daily.  b complex vitamins tablet Take 1 Tab by mouth daily. No current facility-administered medications on file prior to visit. Allergies Allergen Reactions  Pcn [Penicillins] Hives, Shortness of Breath and Swelling Past Medical History:  
Diagnosis Date  Gout  Hypertension  Tachycardia   
 mild tachychardia with palpitations Past Surgical History:  
Procedure Laterality Date  HX SEPTOPLASTY Family History Problem Relation Age of Onset  Diabetes Mother  Cancer Mother  Allergy-severe Mother   
     severe allergy to statins Social History Socioeconomic History  Marital status: UNKNOWN Spouse name: Not on file  Number of children: Not on file  Years of education: Not on file  Highest education level: Not on file Social Needs  Financial resource strain: Not on file  Food insecurity - worry: Not on file  Food insecurity - inability: Not on file  Transportation needs - medical: Not on file  Transportation needs - non-medical: Not on file Occupational History  Not on file Tobacco Use  Smoking status: Never Smoker  Smokeless tobacco: Never Used Substance and Sexual Activity  Alcohol use: Yes Comment: occationally  Drug use: No  
 Sexual activity: Yes  
  Partners: Female Birth control/protection: Condom Other Topics Concern  Not on file Social History Narrative  Not on file Review of Systems Constitutional: Positive for malaise/fatigue. HENT: Positive for sore throat. Negative for congestion. Respiratory: Negative for cough and shortness of breath. Cardiovascular: Negative for chest pain and palpitations. Genitourinary: Positive for flank pain, frequency and hematuria. Negative for urgency. Musculoskeletal: Positive for back pain and falls. Negative for joint pain and myalgias. Neurological: Positive for headaches. Negative for dizziness, tingling, sensory change and weakness. Psychiatric/Behavioral: Negative for depression, memory loss and substance abuse. Visit Vitals BP (!) 132/91 (BP 1 Location: Left arm, BP Patient Position: Sitting) Pulse 79 Temp 98.1 °F (36.7 °C) (Oral) Resp 16 Ht 6' 1\" (1.854 m) Wt 270 lb (122.5 kg) SpO2 97% BMI 35.62 kg/m² Physical Exam  
Constitutional: He is oriented to person, place, and time. He appears well-developed. No distress. Obese HENT:  
Right Ear: External ear normal.  
Left Ear: External ear normal.  
Posterior pharynx erythema Eyes: Conjunctivae and EOM are normal. Right eye exhibits no discharge. Left eye exhibits no discharge. Neck: Normal range of motion. Neck supple. Cardiovascular: Normal rate and regular rhythm. Pulmonary/Chest: Effort normal and breath sounds normal. He has no wheezes. Abdominal: Soft. Bowel sounds are normal. There is tenderness. R side tenderness Musculoskeletal:  
BL low mid back pain, R more than L, tender to touch Lymphadenopathy:  
  He has no cervical adenopathy. Neurological: He is alert and oriented to person, place, and time. Skin: He is not diaphoretic. Psychiatric: He has a normal mood and affect. His behavior is normal.  
Nursing note and vitals reviewed. ASSESSMENT and PLAN 
  ICD-10-CM ICD-9-CM 1. Hematuria, microscopic R31.29 599.72 US RETROPERITONEUM COMP CBC WITH AUTOMATED DIFF  
   METABOLIC PANEL, COMPREHENSIVE  
   cyclobenzaprine (FLEXERIL) 10 mg tablet sent to pharmacy. Retroperitoneum US ordered. CBC and CMP ordered. Flexeril 10 mg prescribed. Discussed that he should drink plenty of water. 2. Acute midline low back pain without sciatica M54.5 724.2 US RETROPERITONEUM COMP  
   cyclobenzaprine (FLEXERIL) 10 mg tablet sent to pharmacy. Retroperitoneum US ordered. Flexeril 10 mg prescribed, which he should take at night for his back pain. 3. Urine frequency R35.0 788.41 AMB POC URINALYSIS DIP STICK AUTO W/O MICRO  
   US RETROPERITONEUM COMP 
 
UA performed in office found trace blood. Retroperitoneum US ordered. 4. Sore throat J02.9 462 He should only take Tylenol for his pain. This plan was reviewed with the patient and patient agrees. All questions were answered. This scribe documentation was reviewed by me and accurately reflects the examination and decisions made by me. This note will not be viewable in [address]

## 2018-11-15 VITALS
WEIGHT: 263 LBS | SYSTOLIC BLOOD PRESSURE: 148 MMHG | OXYGEN SATURATION: 98 % | BODY MASS INDEX: 34.85 KG/M2 | TEMPERATURE: 98 F | HEIGHT: 73 IN | HEART RATE: 83 BPM | DIASTOLIC BLOOD PRESSURE: 93 MMHG | RESPIRATION RATE: 14 BRPM

## 2018-11-15 PROBLEM — R77.8 ELEVATED TROPONIN: Status: RESOLVED | Noted: 2018-11-14 | Resolved: 2018-11-15

## 2018-11-15 PROBLEM — R07.9 ACUTE CHEST PAIN: Status: RESOLVED | Noted: 2018-11-14 | Resolved: 2018-11-15

## 2018-11-15 LAB
ABO + RH BLD: NORMAL
ALBUMIN SERPL-MCNC: 4.6 G/DL (ref 3.5–5.5)
ALBUMIN/GLOB SERPL: 1.6 {RATIO} (ref 1.2–2.2)
ALP SERPL-CCNC: 47 IU/L (ref 39–117)
ALT SERPL-CCNC: 44 IU/L (ref 0–44)
ANION GAP SERPL CALC-SCNC: 9 MMOL/L (ref 5–15)
AST SERPL-CCNC: 23 IU/L (ref 0–40)
ATRIAL RATE: 106 BPM
BASOPHILS # BLD AUTO: 0.1 X10E3/UL (ref 0–0.2)
BASOPHILS # BLD: 0.1 K/UL (ref 0–0.1)
BASOPHILS NFR BLD AUTO: 1 %
BASOPHILS NFR BLD: 1 % (ref 0–1)
BILIRUB SERPL-MCNC: 0.4 MG/DL (ref 0–1.2)
BILIRUB UR QL STRIP: NEGATIVE
BLOOD GROUP ANTIBODIES SERPL: NORMAL
BUN SERPL-MCNC: 14 MG/DL (ref 6–20)
BUN SERPL-MCNC: 14 MG/DL (ref 6–24)
BUN/CREAT SERPL: 12 (ref 12–20)
BUN/CREAT SERPL: 14 (ref 9–20)
CALCIUM SERPL-MCNC: 8.6 MG/DL (ref 8.5–10.1)
CALCIUM SERPL-MCNC: 9.8 MG/DL (ref 8.7–10.2)
CALCULATED P AXIS, ECG09: 37 DEGREES
CALCULATED R AXIS, ECG10: 54 DEGREES
CALCULATED T AXIS, ECG11: 34 DEGREES
CHLORIDE SERPL-SCNC: 104 MMOL/L (ref 96–106)
CHLORIDE SERPL-SCNC: 108 MMOL/L (ref 97–108)
CHOLEST SERPL-MCNC: 150 MG/DL
CO2 SERPL-SCNC: 24 MMOL/L (ref 20–29)
CO2 SERPL-SCNC: 24 MMOL/L (ref 21–32)
CREAT SERPL-MCNC: 1.03 MG/DL (ref 0.76–1.27)
CREAT SERPL-MCNC: 1.13 MG/DL (ref 0.7–1.3)
DIAGNOSIS, 93000: NORMAL
DIFFERENTIAL METHOD BLD: ABNORMAL
EOSINOPHIL # BLD AUTO: 0.2 X10E3/UL (ref 0–0.4)
EOSINOPHIL # BLD: 0.2 K/UL (ref 0–0.4)
EOSINOPHIL NFR BLD AUTO: 2 %
EOSINOPHIL NFR BLD: 3 % (ref 0–7)
ERYTHROCYTE [DISTWIDTH] IN BLOOD BY AUTOMATED COUNT: 11.9 % (ref 11.5–14.5)
ERYTHROCYTE [DISTWIDTH] IN BLOOD BY AUTOMATED COUNT: 13.3 % (ref 12.3–15.4)
FLUAV AG NPH QL IA: NEGATIVE
FLUBV AG NOSE QL IA: NEGATIVE
GLOBULIN SER CALC-MCNC: 2.8 G/DL (ref 1.5–4.5)
GLUCOSE SERPL-MCNC: 79 MG/DL (ref 65–99)
GLUCOSE SERPL-MCNC: 91 MG/DL (ref 65–100)
GLUCOSE UR-MCNC: NEGATIVE MG/DL
HCT VFR BLD AUTO: 36.2 % (ref 36.6–50.3)
HCT VFR BLD AUTO: 42.2 % (ref 37.5–51)
HDLC SERPL-MCNC: 39 MG/DL
HDLC SERPL: 3.8 {RATIO} (ref 0–5)
HGB BLD-MCNC: 12.8 G/DL (ref 12.1–17)
HGB BLD-MCNC: 14.1 G/DL (ref 13–17.7)
IMM GRANULOCYTES # BLD: 0 K/UL (ref 0–0.04)
IMM GRANULOCYTES # BLD: 0 X10E3/UL (ref 0–0.1)
IMM GRANULOCYTES NFR BLD AUTO: 0 % (ref 0–0.5)
IMM GRANULOCYTES NFR BLD: 0 %
KETONES P FAST UR STRIP-MCNC: NEGATIVE MG/DL
LDLC SERPL CALC-MCNC: 72.4 MG/DL (ref 0–100)
LIPID PROFILE,FLP: ABNORMAL
LYMPHOCYTES # BLD AUTO: 2.4 X10E3/UL (ref 0.7–3.1)
LYMPHOCYTES # BLD: 2.3 K/UL (ref 0.8–3.5)
LYMPHOCYTES NFR BLD AUTO: 31 %
LYMPHOCYTES NFR BLD: 39 % (ref 12–49)
MCH RBC QN AUTO: 31.5 PG (ref 26.6–33)
MCH RBC QN AUTO: 32.9 PG (ref 26–34)
MCHC RBC AUTO-ENTMCNC: 33.4 G/DL (ref 31.5–35.7)
MCHC RBC AUTO-ENTMCNC: 35.4 G/DL (ref 30–36.5)
MCV RBC AUTO: 93.1 FL (ref 80–99)
MCV RBC AUTO: 94 FL (ref 79–97)
MONOCYTES # BLD AUTO: 0.7 X10E3/UL (ref 0.1–0.9)
MONOCYTES # BLD: 0.5 K/UL (ref 0–1)
MONOCYTES NFR BLD AUTO: 9 %
MONOCYTES NFR BLD: 9 % (ref 5–13)
NEUTROPHILS # BLD AUTO: 4.5 X10E3/UL (ref 1.4–7)
NEUTROPHILS NFR BLD AUTO: 57 %
NEUTS SEG # BLD: 3 K/UL (ref 1.8–8)
NEUTS SEG NFR BLD: 49 % (ref 32–75)
NRBC # BLD: 0 K/UL (ref 0–0.01)
NRBC BLD-RTO: 0 PER 100 WBC
P-R INTERVAL, ECG05: 128 MS
PH UR STRIP: 5 [PH] (ref 4.6–8)
PLATELET # BLD AUTO: 199 K/UL (ref 150–400)
PLATELET # BLD AUTO: 231 X10E3/UL (ref 150–379)
PMV BLD AUTO: 10.4 FL (ref 8.9–12.9)
POTASSIUM SERPL-SCNC: 3.9 MMOL/L (ref 3.5–5.1)
POTASSIUM SERPL-SCNC: 4.1 MMOL/L (ref 3.5–5.2)
PROT SERPL-MCNC: 7.4 G/DL (ref 6–8.5)
PROT UR QL STRIP: NEGATIVE
Q-T INTERVAL, ECG07: 340 MS
QRS DURATION, ECG06: 92 MS
QTC CALCULATION (BEZET), ECG08: 451 MS
RBC # BLD AUTO: 3.89 M/UL (ref 4.1–5.7)
RBC # BLD AUTO: 4.48 X10E6/UL (ref 4.14–5.8)
SODIUM SERPL-SCNC: 140 MMOL/L (ref 134–144)
SODIUM SERPL-SCNC: 141 MMOL/L (ref 136–145)
SP GR UR STRIP: 6.5 (ref 1–1.03)
SPECIMEN EXP DATE BLD: NORMAL
TRIGL SERPL-MCNC: 193 MG/DL (ref ?–150)
TROPONIN I BLD-MCNC: <0.04 NG/ML (ref 0–0.08)
TROPONIN I SERPL-MCNC: 0.11 NG/ML
TROPONIN I SERPL-MCNC: 0.12 NG/ML
UA UROBILINOGEN AMB POC: ABNORMAL (ref 0.2–1)
URINALYSIS CLARITY POC: CLEAR
URINALYSIS COLOR POC: YELLOW
URINE BLOOD POC: ABNORMAL
URINE LEUKOCYTES POC: NEGATIVE
URINE NITRITES POC: NEGATIVE
VENTRICULAR RATE, ECG03: 106 BPM
VLDLC SERPL CALC-MCNC: 38.6 MG/DL
WBC # BLD AUTO: 6.1 K/UL (ref 4.1–11.1)
WBC # BLD AUTO: 7.8 X10E3/UL (ref 3.4–10.8)

## 2018-11-15 PROCEDURE — 36415 COLL VENOUS BLD VENIPUNCTURE: CPT

## 2018-11-15 PROCEDURE — 99218 HC RM OBSERVATION: CPT

## 2018-11-15 PROCEDURE — 86901 BLOOD TYPING SEROLOGIC RH(D): CPT

## 2018-11-15 PROCEDURE — 80048 BASIC METABOLIC PNL TOTAL CA: CPT

## 2018-11-15 PROCEDURE — 74011250636 HC RX REV CODE- 250/636: Performed by: HOSPITALIST

## 2018-11-15 PROCEDURE — 87804 INFLUENZA ASSAY W/OPTIC: CPT

## 2018-11-15 PROCEDURE — 74011250637 HC RX REV CODE- 250/637: Performed by: HOSPITALIST

## 2018-11-15 PROCEDURE — 80061 LIPID PANEL: CPT

## 2018-11-15 PROCEDURE — 84484 ASSAY OF TROPONIN QUANT: CPT

## 2018-11-15 PROCEDURE — 74011250636 HC RX REV CODE- 250/636: Performed by: FAMILY MEDICINE

## 2018-11-15 PROCEDURE — 96361 HYDRATE IV INFUSION ADD-ON: CPT

## 2018-11-15 PROCEDURE — 85025 COMPLETE CBC W/AUTO DIFF WBC: CPT

## 2018-11-15 RX ORDER — ONDANSETRON 2 MG/ML
4 INJECTION INTRAMUSCULAR; INTRAVENOUS
Status: DISCONTINUED | OUTPATIENT
Start: 2018-11-15 | End: 2018-11-15 | Stop reason: HOSPADM

## 2018-11-15 RX ORDER — LEVOFLOXACIN 250 MG/1
250 TABLET ORAL EVERY 24 HOURS
Status: DISCONTINUED | OUTPATIENT
Start: 2018-11-15 | End: 2018-11-15 | Stop reason: HOSPADM

## 2018-11-15 RX ORDER — LEVOFLOXACIN 250 MG/1
250 TABLET ORAL EVERY 24 HOURS
Qty: 7 TAB | Refills: 0 | Status: SHIPPED | OUTPATIENT
Start: 2018-11-15 | End: 2019-06-06 | Stop reason: ALTCHOICE

## 2018-11-15 RX ORDER — KETOROLAC TROMETHAMINE 30 MG/ML
15 INJECTION, SOLUTION INTRAMUSCULAR; INTRAVENOUS
Status: DISCONTINUED | OUTPATIENT
Start: 2018-11-15 | End: 2018-11-15 | Stop reason: HOSPADM

## 2018-11-15 RX ORDER — TAMSULOSIN HYDROCHLORIDE 0.4 MG/1
0.4 CAPSULE ORAL DAILY
Qty: 30 CAP | Refills: 0 | Status: SHIPPED | OUTPATIENT
Start: 2018-11-15

## 2018-11-15 RX ORDER — SODIUM CHLORIDE 9 MG/ML
100 INJECTION, SOLUTION INTRAVENOUS CONTINUOUS
Status: DISCONTINUED | OUTPATIENT
Start: 2018-11-15 | End: 2018-11-15 | Stop reason: HOSPADM

## 2018-11-15 RX ORDER — TAMSULOSIN HYDROCHLORIDE 0.4 MG/1
0.4 CAPSULE ORAL DAILY
Status: DISCONTINUED | OUTPATIENT
Start: 2018-11-15 | End: 2018-11-15 | Stop reason: HOSPADM

## 2018-11-15 RX ORDER — KETOROLAC TROMETHAMINE 10 MG/1
10 TABLET, FILM COATED ORAL
Qty: 15 TAB | Refills: 0 | Status: SHIPPED | OUTPATIENT
Start: 2018-11-15 | End: 2021-11-01 | Stop reason: ALTCHOICE

## 2018-11-15 RX ORDER — SODIUM CHLORIDE 0.9 % (FLUSH) 0.9 %
5-10 SYRINGE (ML) INJECTION AS NEEDED
Status: DISCONTINUED | OUTPATIENT
Start: 2018-11-15 | End: 2018-11-15 | Stop reason: HOSPADM

## 2018-11-15 RX ORDER — SODIUM CHLORIDE 0.9 % (FLUSH) 0.9 %
5-10 SYRINGE (ML) INJECTION EVERY 8 HOURS
Status: DISCONTINUED | OUTPATIENT
Start: 2018-11-15 | End: 2018-11-15 | Stop reason: HOSPADM

## 2018-11-15 RX ADMIN — TAMSULOSIN HYDROCHLORIDE 0.4 MG: 0.4 CAPSULE ORAL at 10:41

## 2018-11-15 RX ADMIN — Medication 10 ML: at 14:17

## 2018-11-15 RX ADMIN — Medication 10 ML: at 04:42

## 2018-11-15 RX ADMIN — SODIUM CHLORIDE 100 ML/HR: 900 INJECTION, SOLUTION INTRAVENOUS at 14:17

## 2018-11-15 RX ADMIN — SODIUM CHLORIDE 1000 ML: 900 INJECTION, SOLUTION INTRAVENOUS at 04:06

## 2018-11-15 RX ADMIN — LEVOFLOXACIN 250 MG: 250 TABLET, FILM COATED ORAL at 11:56

## 2018-11-15 NOTE — PROGRESS NOTES
Pt seen and examined, he reports no pain, I have discussed with urology RN and awaiting urologist recommendations. Continue with Flomax.

## 2018-11-15 NOTE — DISCHARGE SUMMARY
Discharge Summary       PATIENT ID: Manuel Christine  MRN: [id number]   YOB: 1975    DATE OF ADMISSION: 11/14/2018  8:42 PM    DATE OF DISCHARGE: 11/15/18  PRIMARY CARE PROVIDER: Yesica Harris MD       DISCHARGING PROVIDER: Cj Pickett MD    To contact this individual call [phone] and ask the  to page. If unavailable ask to be transferred the Adult Hospitalist Department. CONSULTATIONS: IP CONSULT TO CARDIOLOGY  IP CONSULT TO UROLOGY      PROCEDURES/SURGERIES: * No surgery found *    IMAGING  Xr Chest Pa Lat    Result Date: 11/14/2018  IMPRESSION: No acute intrathoracic disease. Ct Abd Pelv Wo Cont    Result Date: 11/14/2018  IMPRESSION: 3 mm stone in the proximal right ureter causing minimal hydronephrosis. Multiple additional small nonobstructive stones are seen in the right kidney. HISTORY OF PRESENT ILLNESS as per admitting:  A 46-year-old white male with past medical history of gout, hypertension, tachycardia, palpitations, presented to the emergency department from home with chief complaint of right flank pain. The patient is a reluctant historian. He provides limited details in regards to recent history. At current he notes his symptoms have resolved. He notes that he initially came to the emergency department with right flank pain that was severe. He initially noted symptoms after having a fall approximately 1 week ago from a 2-3 foot platform onto a wooden floor landing onto his back. Patient was reportedly able to mobilize after the event, started having some symptoms of generalized muscle soreness, fatigue, intermittent chest pain, headache, cough and sore throat. He reportedly was seen by his PCP for the same yesterday, had lab work and a UA performed (which showed up in blood in his urine) with no evidence of infection.   According to the ED provider's note, the patient described a sudden onset of \"explosive\" severe pain in his right flank region that felt like a \"hammer hit\" that area, rating pain as 10/10, not relieved with the use of Flexeril. Pain radiated throughout that area, dull aching in character. He notes that he had chest pain symptoms located in the substernal region of his chest greater than 1 \"out of a 10\" approximately 2 days ago with heart beating, nonradiating again without specific exacerbating or relieving factors. Initial recorded vital signs in the emergency department were blood pressure 203/136, heart rate 112, respiratory rate 18, O2 saturation 97% on room air. The workup included a 12-lead EKG showing sinus tachycardia 106 beats per minute. Initial troponin equals 0.12.  UA showed evidence of microscopic hematuria, but no evidence of UTI. CT abdomen and pelvis without contrast showed a 3 mm stone in the proximal right ureter causing minimal hydronephrosis with multiple additional nonobstructing stones in the right kidney. Chest x-ray PA and lateral showed no acute cardiopulmonary process. Per the ED, the patient was given Flomax 0.4 mg p.o., 0.9% normal saline, 1000 mL IV fluid bolus, and Toradol 30 mg IV for pain. Patient is now seen for admission to the hospitalist service for continued evaluation and treatment. At current, he does not complain of any dizziness, lightheadedness, focal weakness, numbness, paresthesias, slurred speech, facial droop, headache, neck pain, visual disturbance, current shortness of breath, nausea, vomiting, diarrhea, melena, dysuria, calf pain, swelling, edema, fever, chills or rash.           HOSPITAL COURSE:   Kidney Stone with mild hydronephrosis  Seen by urology and cleared for d/c  No plans for inpatient stent  Op followup, pt wants it too  Flomax to help with Stone passage  Advised on hydration  Abx Per urology for Stone Prophylaxis    Elevated troponin   Likely non cardiac - per cardiology  Likely from Stress, Op stress test per cardiology\Pt denies Chest pains    Continue Home medications for Chronic medical problems as prior  Pt advised on op followup with urology and cardiology      DISCHARGE DIAGNOSES / PLAN:      Patient Active Problem List   Diagnosis Code    Hypertriglyceridemia E78.1    Idiopathic gout M10.00    Obesity (BMI 30-39. 9) E66.9    Essential hypertension I10    Severe obesity ([address] E66.01    Hydronephrosis of right kidney N13.30    Right ureteral stone N20.1             FOLLOW UP APPOINTMENTS:    Follow-up Information     Follow up With Specialties Details Why Contact Info    Conrado Gilliland MD Internal Medicine   [address]  Internal Medicine  [facility] [address]  [phone]             ADDITIONAL CARE RECOMMENDATIONS:     · It is important that you take the medication exactly as they are prescribed. · Keep your medication in the bottles provided by the pharmacist and keep a list of the medication names, dosages, and times to be taken in your wallet. · Do not take other medications without consulting your doctor. · No drinking alcohol or driving car or operating machinery if you are on narcotic pain medications. Donot take sedating mediations if you are sleepy or confused. · Fall Precautions  · Keep Well Hydrated  · Report to your medical provider if you feel you have  developed allergies to medications  · Follow up with your PCP or Consultant for medication adjustments and refills  · Monitor for signs of fevers,chills,bleeding,chest pain and seek medical attention if you do so. DIET: Cardiac Diet    ACTIVITY: Activity as tolerated          DISCHARGE MEDICATIONS:  Current Discharge Medication List      START taking these medications    Details   ketorolac (TORADOL) 10 mg tablet Take 1 Tab by mouth every six (6) hours as needed for Pain. Qty: 15 Tab, Refills: 0      tamsulosin (FLOMAX) 0.4 mg capsule Take 1 Cap by mouth daily.   Qty: 30 Cap, Refills: 0 levoFLOXacin (LEVAQUIN) 250 mg tablet Take 1 Tab by mouth every twenty-four (24) hours. Qty: 7 Tab, Refills: 0         CONTINUE these medications which have NOT CHANGED    Details   cyclobenzaprine (FLEXERIL) 10 mg tablet Take 1 Tab by mouth nightly for 15 days. Qty: 15 Tab, Refills: 0    Associated Diagnoses: Hematuria, microscopic; Acute midline low back pain without sciatica      ULORIC 40 mg tab tablet TAKE 1 TABLET BY MOUTH EVERY DAY  Qty: 90 Tab, Refills: 0      fenofibrate nanocrystallized (TRICOR) 145 mg tablet TAKE 1 TABLET BY ORAL ROUTE EVERY DAY  Qty: 90 Tab, Refills: 0      nebivolol (BYSTOLIC) 5 mg tablet Take 1 Tab by mouth daily. Qty: 90 Tab, Refills: 2    Associated Diagnoses: Essential hypertension with goal blood pressure less than 140/90      atorvastatin (LIPITOR) 20 mg tablet Take 1 Tab by mouth daily. Qty: 90 Tab, Refills: 2      esomeprazole (NEXIUM) 40 mg capsule Take  by mouth daily. omega-3 fatty acids-vitamin e 1,000 mg cap Take 1 Cap by mouth.      b complex vitamins tablet Take 1 Tab by mouth daily.          STOP taking these medications       cholecalciferol (VITAMIN D3) 1,000 unit cap Comments:   Reason for Stopping:               All Micro Results     Procedure Component Value Units Date/Time    CULTURE, THROAT [[id number]] Collected:  11/14/18 2148    Order Status:  Completed Specimen:  Throat swab Updated:  11/15/18 1419     Special Requests: NO SPECIAL REQUESTS        Culture result:       NORMAL RESPIRATORY SALLY/NO BETA STREP ISOLATED          INFLUENZA A & B AG (RAPID TEST) [[id number]] Collected:  11/15/18 0027    Order Status:  Completed Specimen:  Nasopharyngeal from Nasal washing Updated:  11/15/18 0051     Influenza A Antigen NEGATIVE         Influenza B Antigen NEGATIVE        URINE CULTURE HOLD SAMPLE [[id number]] Collected:  11/14/18 2138    Order Status:  Completed Specimen:  Urine from Serum Updated:  11/14/18 2142     Urine culture hold       URINE ON HOLD IN MICROBIOLOGY DEPT FOR 3 DAYS. IF UNPRESERVED URINE IS SUBMITTED, IT CANNOT BE USED FOR ADDITIONAL TESTING AFTER 24 HRS, RECOLLECTION WILL BE REQUIRED. Recent Results (from the past 24 hour(s))   EKG, 12 LEAD, INITIAL    Collection Time: 11/14/18  9:06 PM   Result Value Ref Range    Ventricular Rate 106 BPM    Atrial Rate 106 BPM    P-R Interval 128 ms    QRS Duration 92 ms    Q-T Interval 340 ms    QTC Calculation (Bezet) 451 ms    Calculated P Axis 37 degrees    Calculated R Axis 54 degrees    Calculated T Axis 34 degrees    Diagnosis       Sinus tachycardia  No previous ECGs available  Confirmed by Bhumi Medina MD. ([id number]) on 11/15/2018 3:58:01 PM     CBC WITH AUTOMATED DIFF    Collection Time: 11/14/18  9:34 PM   Result Value Ref Range    WBC 7.4 4.1 - 11.1 K/uL    RBC 4.20 4. 10 - 5.70 M/uL    HGB 13.7 12.1 - 17.0 g/dL    HCT 38.5 36.6 - 50.3 %    MCV 91.7 80.0 - 99.0 FL    MCH 32.6 26.0 - 34.0 PG    MCHC 35.6 30.0 - 36.5 g/dL    RDW 11.7 11.5 - 14.5 %    PLATELET 213 183 - 085 K/uL    MPV 9.8 8.9 - 12.9 FL    NRBC 0.0 0  WBC    ABSOLUTE NRBC 0.00 0.00 - 0.01 K/uL    NEUTROPHILS 51 32 - 75 %    LYMPHOCYTES 35 12 - 49 %    MONOCYTES 11 5 - 13 %    EOSINOPHILS 2 0 - 7 %    BASOPHILS 1 0 - 1 %    IMMATURE GRANULOCYTES 0 0.0 - 0.5 %    ABS. NEUTROPHILS 3.8 1.8 - 8.0 K/UL    ABS. LYMPHOCYTES 2.6 0.8 - 3.5 K/UL    ABS. MONOCYTES 0.8 0.0 - 1.0 K/UL    ABS. EOSINOPHILS 0.2 0.0 - 0.4 K/UL    ABS. BASOPHILS 0.1 0.0 - 0.1 K/UL    ABS. IMM.  GRANS. 0.0 0.00 - 0.04 K/UL    DF AUTOMATED     METABOLIC PANEL, COMPREHENSIVE    Collection Time: 11/14/18  9:34 PM   Result Value Ref Range    Sodium 139 136 - 145 mmol/L    Potassium 3.5 3.5 - 5.1 mmol/L    Chloride 106 97 - 108 mmol/L    CO2 24 21 - 32 mmol/L    Anion gap 9 5 - 15 mmol/L    Glucose 78 65 - 100 mg/dL    BUN 15 6 - 20 MG/DL    Creatinine 1.25 0.70 - 1.30 MG/DL    BUN/Creatinine ratio 12 12 - 20      GFR est AA >60 >60 ml/min/1.73m2    GFR est non-AA >60 >60 ml/min/1.73m2    Calcium 9.4 8.5 - 10.1 MG/DL    Bilirubin, total 0.3 0.2 - 1.0 MG/DL    ALT (SGPT) 56 12 - 78 U/L    AST (SGOT) 24 15 - 37 U/L    Alk. phosphatase 44 (L) 45 - 117 U/L    Protein, total 8.2 6.4 - 8.2 g/dL    Albumin 4.2 3.5 - 5.0 g/dL    Globulin 4.0 2.0 - 4.0 g/dL    A-G Ratio 1.1 1.1 - 2.2     LIPASE    Collection Time: 11/14/18  9:34 PM   Result Value Ref Range    Lipase 343 73 - 393 U/L   TROPONIN I    Collection Time: 11/14/18  9:34 PM   Result Value Ref Range    Troponin-I, Qt. 0.12 (H) <0.05 ng/mL   PTT    Collection Time: 11/14/18  9:34 PM   Result Value Ref Range    aPTT 27.7 22.1 - 32.0 sec    aPTT, therapeutic range     58.0 - 77.0 SECS   PROTHROMBIN TIME + INR    Collection Time: 11/14/18  9:34 PM   Result Value Ref Range    INR 1.1 0.9 - 1.1      Prothrombin time 10.8 9.0 - 11.1 sec   CK    Collection Time: 11/14/18  9:34 PM   Result Value Ref Range     39 - 308 U/L   URINALYSIS W/MICROSCOPIC    Collection Time: 11/14/18  9:38 PM   Result Value Ref Range    Color YELLOW/STRAW      Appearance CLOUDY (A) CLEAR      Specific gravity 1.020 1.003 - 1.030      pH (UA) 5.5 5.0 - 8.0      Protein NEGATIVE  NEG mg/dL    Glucose NEGATIVE  NEG mg/dL    Ketone NEGATIVE  NEG mg/dL    Bilirubin NEGATIVE  NEG      Blood LARGE (A) NEG      Urobilinogen 1.0 0.2 - 1.0 EU/dL    Nitrites NEGATIVE  NEG      Leukocyte Esterase NEGATIVE  NEG      WBC 0-4 0 - 4 /hpf    RBC >100 (H) 0 - 5 /hpf    Epithelial cells FEW FEW /lpf    Bacteria NEGATIVE  NEG /hpf    Hyaline cast 2-5 0 - 5 /lpf   URINE CULTURE HOLD SAMPLE    Collection Time: 11/14/18  9:38 PM   Result Value Ref Range    Urine culture hold        URINE ON HOLD IN MICROBIOLOGY DEPT FOR 3 DAYS. IF UNPRESERVED URINE IS SUBMITTED, IT CANNOT BE USED FOR ADDITIONAL TESTING AFTER 24 HRS, RECOLLECTION WILL BE REQUIRED.    POC GROUP A STREP    Collection Time: 11/14/18  9:44 PM   Result Value Ref Range    Group A strep (POC) NEGATIVE  NEG CULTURE, THROAT    Collection Time: 11/14/18  9:48 PM   Result Value Ref Range    Special Requests: NO SPECIAL REQUESTS      Culture result: NORMAL RESPIRATORY SALLY/NO BETA STREP ISOLATED     POC TROPONIN-I    Collection Time: 11/15/18 12:25 AM   Result Value Ref Range    Troponin-I (POC) <0.04 0.00 - 0.08 ng/mL   INFLUENZA A & B AG (RAPID TEST)    Collection Time: 11/15/18 12:27 AM   Result Value Ref Range    Influenza A Antigen NEGATIVE  NEG      Influenza B Antigen NEGATIVE  NEG     TROPONIN I    Collection Time: 11/15/18  2:39 AM   Result Value Ref Range    Troponin-I, Qt. 0.11 (H) <0.05 ng/mL   TYPE & SCREEN    Collection Time: 11/15/18  2:39 AM   Result Value Ref Range    Crossmatch Expiration 11/18/2018     ABO/Rh(D) B POSITIVE     Antibody screen NEG    METABOLIC PANEL, BASIC    Collection Time: 11/15/18  2:39 AM   Result Value Ref Range    Sodium 141 136 - 145 mmol/L    Potassium 3.9 3.5 - 5.1 mmol/L    Chloride 108 97 - 108 mmol/L    CO2 24 21 - 32 mmol/L    Anion gap 9 5 - 15 mmol/L    Glucose 91 65 - 100 mg/dL    BUN 14 6 - 20 MG/DL    Creatinine 1.13 0.70 - 1.30 MG/DL    BUN/Creatinine ratio 12 12 - 20      GFR est AA >60 >60 ml/min/1.73m2    GFR est non-AA >60 >60 ml/min/1.73m2    Calcium 8.6 8.5 - 10.1 MG/DL   LIPID PANEL    Collection Time: 11/15/18  2:39 AM   Result Value Ref Range    LIPID PROFILE          Cholesterol, total 150 <200 MG/DL    Triglyceride 193 (H) <150 MG/DL    HDL Cholesterol 39 MG/DL    LDL, calculated 72.4 0 - 100 MG/DL    VLDL, calculated 38.6 MG/DL    CHOL/HDL Ratio 3.8 0 - 5.0     CBC WITH AUTOMATED DIFF    Collection Time: 11/15/18  2:39 AM   Result Value Ref Range    WBC 6.1 4.1 - 11.1 K/uL    RBC 3.89 (L) 4.10 - 5.70 M/uL    HGB 12.8 12.1 - 17.0 g/dL    HCT 36.2 (L) 36.6 - 50.3 %    MCV 93.1 80.0 - 99.0 FL    MCH 32.9 26.0 - 34.0 PG    MCHC 35.4 30.0 - 36.5 g/dL    RDW 11.9 11.5 - 14.5 %    PLATELET 185 183 - 822 K/uL    MPV 10.4 8.9 - 12.9 FL    NRBC 0.0 0  WBC ABSOLUTE NRBC 0.00 0.00 - 0.01 K/uL    NEUTROPHILS 49 32 - 75 %    LYMPHOCYTES 39 12 - 49 %    MONOCYTES 9 5 - 13 %    EOSINOPHILS 3 0 - 7 %    BASOPHILS 1 0 - 1 %    IMMATURE GRANULOCYTES 0 0.0 - 0.5 %    ABS. NEUTROPHILS 3.0 1.8 - 8.0 K/UL    ABS. LYMPHOCYTES 2.3 0.8 - 3.5 K/UL    ABS. MONOCYTES 0.5 0.0 - 1.0 K/UL    ABS. EOSINOPHILS 0.2 0.0 - 0.4 K/UL    ABS. BASOPHILS 0.1 0.0 - 0.1 K/UL    ABS. IMM. GRANS. 0.0 0.00 - 0.04 K/UL    DF AUTOMATED     TROPONIN I    Collection Time: 11/15/18  5:51 AM   Result Value Ref Range    Troponin-I, Qt. 0.12 (H) <0.05 ng/mL           NOTIFY YOUR PHYSICIAN FOR ANY OF THE FOLLOWING:   Fever over 101 degrees for 24 hours. Chest pain, shortness of breath, fever, chills, nausea, vomiting, diarrhea, change in mentation, falling, weakness, bleeding. Severe pain or pain not relieved by medications. Or, any other signs or symptoms that you may have questions about. DISPOSITION:    Home With:   OT  PT  HH  RN       Long term SNF/Inpatient Rehab   X Independent/assisted living    Hospice    Other:       PATIENT CONDITION AT DISCHARGE:     Functional status    Poor     Deconditioned    X Independent      Cognition   X  Lucid     Forgetful     Dementia      Catheters/lines (plus indication)    Andre     PICC     PEG    X None      Code status    X Full code     DNR      PHYSICAL EXAMINATION AT DISCHARGE:  Gen:  No distress, alert  HEENT:  Normal cephalic atraumatic, extra-occular movements are intact. Neck:  Supple, No JVD  Lungs:  Clear bilaterally, no wheeze, no rales, normal effort  Heart:  Regular Rate and Rhythm, normal S1 and S2, no edema  Abdomen:  Soft, non tender, normal bowel sounds, no guarding.   Extremities:  Well perfused, no cyanosis or edema  Neurological:  Awake and alert, CN's are intact, normal strength throughout extremities  Skin:  No rashes or moles  Mental Status:  Normal thought process, does not appear anxious      CHRONIC MEDICAL DIAGNOSES:  Problem List as of 11/15/2018 Date Reviewed: 11/14/2018          Codes Class Noted - Resolved    Severe obesity ([address] ICD-10-CM: E66.01  ICD-9-CM: 278.01  11/14/2018 - Present        Elevated troponin ICD-10-CM: R74.8  ICD-9-CM: 790.6  11/14/2018 - Present        Acute chest pain ICD-10-CM: R07.9  ICD-9-CM: 786.50  11/14/2018 - Present        * (Principal) Hydronephrosis of right kidney ICD-10-CM: N13.30  ICD-9-CM: 996  11/14/2018 - Present        Right ureteral stone ICD-10-CM: N20.1  ICD-9-CM: 592.1  11/14/2018 - Present        Obesity (BMI 30-39. 9) ICD-10-CM: E66.9  ICD-9-CM: 278.00  4/4/2018 - Present        Essential hypertension ICD-10-CM: I10  ICD-9-CM: 401.9  4/4/2018 - Present        Idiopathic gout ICD-10-CM: M10.00  ICD-9-CM: 274.9  2/9/2016 - Present        Hypertriglyceridemia ICD-10-CM: E78.1  ICD-9-CM: 272.1  8/19/2015 - Present                Discussed with patient and family. Explained the importance of following up, Compliance with medications and recommendations on discharge,Side effect profile of medications were explained. Safety precautions at home and while taking pain medications also explained. All questions answered to the satisfaction of the patient/family. Discussed with consultant(s) who are agreeable to the discharge. Verbal and written instructions on discharge given. Explained about Discharge medications and side effect profile.         Thank you Kelli Holguin MD for taking care of your patient, Please call with any questions.       Greater than 36 minutes were spent with the patient on counseling and coordination of care    Signed:   Ousmane Deleon MD  11/15/2018  4:35 PM

## 2018-11-15 NOTE — PROGRESS NOTES
Spiritual Care Partner Volunteer visited patient in room 356 on 11.15.18. Documented by: : Rev. Shirin Loyola. Ileana Lamas; [facility], to contact [phone] [address] Hernandez call: [phone]

## 2018-11-15 NOTE — ED TRIAGE NOTES
Pt presents with \"explosive pain in right kidney\" Pt had a fall about 10 days ago landing on his back on the wood floor, has felt pain since which has increased in intensity today

## 2018-11-15 NOTE — PROGRESS NOTES
Presents with right flank pain. Now better Ct shows 3mm right prox ureteral stone No fever 
nml wbc and cr 1.1. Works as a vascular tech for [employer] Cardiac workup done for elevated trop neg UA shows microhematuria. No bacteria Impression Right prox ureteral stone--agrees to outpatient fu and discharge on toradol prn and flomax. Risks reviewed Microhematuria. Agrees to recheck urine on followup. Agrees to workup if it persists after stone resolution.

## 2018-11-15 NOTE — PROGRESS NOTES
Urology Consult  11/14/18:  Rt hydronephrosis, rt flank pain, rt ureteral stone microscopic hematuria. Dx. Renal Colic Per Hospitalist, pt is ready for discharge . 0.4 mg Tamsulosin started 11/1518. Pt reports improved pain 11/115/18 From chart: CHIEF COMPLAINT:  Right flank pain. 
  
HISTORY OF PRESENT ILLNESS:  A 21-year-old white male with past medical history of gout, hypertension, tachycardia, palpitations, presented to the emergency department from home with chief complaint of right flank pain. The patient is a reluctant historian. He provides limited details in regards to recent history. At current he notes his symptoms have resolved. He notes that he initially came to the emergency department with right flank pain that was severe. He initially noted symptoms after having a fall approximately 1 week ago from a 2-3 foot platform onto a wooden floor landing onto his back. Patient was reportedly able to mobilize after the event, started having some symptoms of generalized muscle soreness, fatigue, intermittent chest pain, headache, cough and sore throat. He reportedly was seen by his PCP for the same yesterday, had lab work and a UA performed (which showed up in blood in his urine) with no evidence of infection. According to the ED provider's note, the patient described a sudden onset of \"explosive\" severe pain in his right flank region that felt like a \"hammer hit\" that area, rating pain as 10/10, not relieved with the use of Flexeril. Pain radiated throughout that area, dull aching in character. He notes that he had chest pain symptoms located in the substernal region of his chest greater than 1 \"out of a 10\" approximately 2 days ago with heart beating, nonradiating again without specific exacerbating or relieving factors. Initial recorded vital signs in the emergency department were blood pressure 203/136, heart rate 112, respiratory rate 18, O2 saturation 97% on room air.   The workup included a 12-lead EKG showing sinus tachycardia 106 beats per minute. Initial troponin equals 0.12.  UA showed evidence of microscopic hematuria, but no evidence of UTI. CT abdomen and pelvis without contrast showed a 3 mm stone in the proximal right ureter causing minimal hydronephrosis with multiple additional nonobstructing stones in the right kidney. Chest x-ray PA and lateral showed no acute cardiopulmonary process. Per the ED, the patient was given Flomax 0.4 mg p.o., 0.9% normal saline, 1000 mL IV fluid bolus, and Toradol 30 mg IV for pain. Patient is now seen for admission to the hospitalist service for continued evaluation and treatment. At current, he does not complain of any dizziness, lightheadedness, focal weakness, numbness, paresthesias, slurred speech, facial droop, headache, neck pain, visual disturbance, current shortness of breath, nausea, vomiting, diarrhea, melena, dysuria, calf pain, swelling, edema, fever, chills or rash. 
  
PAST MEDICAL HISTORY: 
1. Hypertension. 2.  Tachycardia. 3.  Palpitations. 4.  Left internal carotid artery plaque. 5.  Gout. 
  
PAST SURGICAL HISTORY:  Septoplasty. 
  
MEDICATIONS:  Complete medication list reviewed, noted on chart record. 
  
ALLERGIES:  PENICILLIN. 
  
SOCIAL HISTORY:  Denies smoking. Positive for occasional alcohol. Denies illicit drugs. 
  
FAMILY HISTORY:  Diabetes, mother. Cancer, mother. Allergies, mother. 
  
REVIEW OF SYSTEMS:  Pertinent positives as noted in HPI. All other systems are reviewed and negative. U/A 11/14/18  Microscopic hematuria. 11/15/18 Labs WNL except Troponin CT 11/14/18 EXAM:  CT ABD PELV WO CONT 
  
INDICATION: FLANK PAIN 
  
COMPARISON: None 
  
CONTRAST:  None. 
  
TECHNIQUE:  
Thin axial images were obtained through the abdomen and pelvis. Coronal and 
sagittal reconstructions were generated. Oral contrast was not administered.  CT 
 dose reduction was achieved through use of a standardized protocol tailored for 
this examination and automatic exposure control for dose modulation.  
  
The absence of intravenous contrast material reduces the sensitivity for 
evaluation of the solid parenchymal organs of the abdomen.  
  
FINDINGS:  
LUNG BASES: Clear. INCIDENTALLY IMAGED HEART AND MEDIASTINUM: Unremarkable. LIVER: The liver is hypodense related to hepatic steatosis. No mass or biliary 
dilatation. GALLBLADDER: Unremarkable. SPLEEN: No mass. PANCREAS: No mass or ductal dilatation. ADRENALS: Unremarkable. KIDNEYS/URETERS: Small nonobstructive stones are seen in the right kidney. There 
is a 3 mm stone in the proximal right ureter with minimal right-sided 
hydronephrosis. STOMACH: Unremarkable. SMALL BOWEL: No dilatation or wall thickening. COLON: No dilatation or wall thickening. APPENDIX: Unremarkable. PERITONEUM: No ascites or pneumoperitoneum. RETROPERITONEUM: No lymphadenopathy or aortic aneurysm. REPRODUCTIVE ORGANS: The prostate and seminal vesicles are unremarkable URINARY BLADDER: No mass or calculus. BONES: No destructive bone lesion. ADDITIONAL COMMENTS: N/A 
  
IMPRESSION IMPRESSION: 
3 mm stone in the proximal right ureter causing minimal hydronephrosis. Multiple 
additional small nonobstructive stones are seen in the right kidney. Urologist aware of consult.    
 
Lori Gaines Urology RN Coordinator  [phone]

## 2018-11-15 NOTE — PROGRESS NOTES
Problem: Pain Goal: *Control of Pain Outcome: Resolved/Met Date Met: 11/15/18 Patient report no pain

## 2018-11-15 NOTE — CONSULTS
Consult    Patient: Eduardo Mendoza MRN: [id number]  SSN: [id number]    YOB: 1975  Age: 41 y.o. Sex: male       Subjective:      Date of  Admission: 11/14/2018     Admission type: Emergency     CC: right flank pain    Eduardo Mendoza is a 41 y.o. male admitted for Right ureteral stone  Acute chest pain  Hydronephrosis of right kidney  Elevated troponin. Elliott Seats presented yesterday evening with sudden onset of right flank pain. The pain was severe, worst he has ever experienced. Pain came and went in several waves. Has been diagnosed with renal colic and is on IV fluids, tramadol and flomax, and his pain has now resolved. He mentioned that he had about 15 seconds of pain in the lower part of his chest a few days ago, mild, resolved spontaneously. A troponin was checked and was borderline abnormal. He has not had any chest pain at all in the past 24hrs. His EKG is normal    He has a h/o HTN and dyslipidemia. His calcium score was 17 a few years ago.     Primary Care Provider: Yonas Carmona MD  Past Medical History:   Diagnosis Date    Gout     Hypertension     Tachycardia     mild tachychardia with palpitations      Past Surgical History:   Procedure Laterality Date    HX SEPTOPLASTY       Family History   Problem Relation Age of Onset    Diabetes Mother     Cancer Mother     Allergy-severe Mother         severe allergy to statins      Social History     Tobacco Use    Smoking status: Never Smoker    Smokeless tobacco: Never Used   Substance Use Topics    Alcohol use: Yes     Comment: occationally      Current Facility-Administered Medications   Medication Dose Route Frequency    sodium chloride (NS) flush 5-10 mL  5-10 mL IntraVENous Q8H    sodium chloride (NS) flush 5-10 mL  5-10 mL IntraVENous PRN    ondansetron (ZOFRAN) injection 4 mg  4 mg IntraVENous Q6H PRN    sodium chloride 0.9 % bolus infusion 1,000 mL  1,000 mL IntraVENous ONCE        Allergies   Allergen Reactions    Pcn [Penicillins] Hives, Shortness of Breath and Swelling        Review of Systems:  A comprehensive review of systems was negative except for that written in the History of Present Illness. Subjective:       Physical Exam:  Visit Vitals  /85 (BP 1 Location: Left arm, BP Patient Position: At rest)   Pulse 91   Temp 97.5 °F (36.4 °C)   Resp 16   Ht 6' 1\" (1.854 m)   Wt 119.3 kg (263 lb)   SpO2 95%   BMI 34.70 kg/m²     General Appearance:  Well developed, well nourished,alert and oriented x 3, and individual in no acute distress. Ears/Nose/Mouth/Throat:   Hearing grossly normal.         Neck: Supple. Chest:   Lungs clear to auscultation bilaterally. Cardiovascular:  Regular rate and rhythm, S1, S2 normal, no murmur. Abdomen:   Soft, non-tender, bowel sounds are active. Extremities: No edema bilaterally. Skin: Warm and dry. Cardiographics:  ECG: sinus tachycardia, otherwise normal, no ischemia      Data Reviewed:   BMP:   Lab Results   Component Value Date/Time     11/15/2018 02:39 AM    K 3.9 11/15/2018 02:39 AM     11/15/2018 02:39 AM    CO2 24 11/15/2018 02:39 AM    AGAP 9 11/15/2018 02:39 AM    GLU 91 11/15/2018 02:39 AM    BUN 14 11/15/2018 02:39 AM    CREA 1.13 11/15/2018 02:39 AM    GFRAA >60 11/15/2018 02:39 AM    GFRNA >60 11/15/2018 02:39 AM     CBC:   Lab Results   Component Value Date/Time    WBC 6.1 11/15/2018 02:39 AM    HGB 12.8 11/15/2018 02:39 AM    HCT 36.2 (L) 11/15/2018 02:39 AM     11/15/2018 02:39 AM     All Cardiac Markers in the last 24 hours:   Lab Results   Component Value Date/Time     11/14/2018 09:34 PM    TROIQ 0.12 (H) 11/15/2018 05:51 AM    TROIQ 0.11 (H) 11/15/2018 02:39 AM    TROIQ 0.12 (H) 11/14/2018 09:34 PM    TNIPOC <0.04 11/15/2018 12:25 AM        Assessment:      1) Renal colic  2) Atypical chest pain  3) Abnormal troponin: flat pattern.  Interestingly the POC trop is undetectable  4) HTN  5) Hyperlipidemia     Plan:     Continue treatment for renal colic  No further inpatient cardiac work-up needed  Will arrange for outpatient stress test to further risk stratify  Discharge was medically stable from point of view of his kidney stone    Signed By: Giovanni Trent MD     November 15, 2018

## 2018-11-15 NOTE — DISCHARGE INSTRUCTIONS
· It is important that you take the medication exactly as they are prescribed. · Keep your medication in the bottles provided by the pharmacist and keep a list of the medication names, dosages, and times to be taken in your wallet. · Do not take other medications without consulting your doctor. · No drinking alcohol or driving car or operating machinery if you are on narcotic pain medications. Donot take sedating mediations if you are sleepy or confused. · Fall Precautions  · Keep Well Hydrated  · Report to your medical provider if you feel you have  developed allergies to medications  · Follow up with your PCP or Consultant for medication adjustments and refills  · Monitor for signs of fevers,chills,bleeding,chest pain and seek medical attention if you do so.

## 2018-11-15 NOTE — PROGRESS NOTES
Bedside and Verbal shift change report given to [facility] (oncoming nurse) by Kristen Child RN (offgoing nurse). Report included the following information SBAR, Procedure Summary, Intake/Output, MAR, Recent Results, Med Rec Status and Cardiac Rhythm NSR.

## 2018-11-15 NOTE — ED PROVIDER NOTES
41 y.o. male with past medical history significant for gout, HTN, presents ambulatory to the ED with chief complaint of right flank pain. Patient reports that he had a fall ~1 week ago when he fell off a 2-3 foot platform onto the wooden floor. States that he landed on his back, but denies head trauma. Patient was able to get off the floor by himself and continue working for the rest of the day, but he states that he has felt \"off\" since the fall. Notes that he has been experiencing generalized muscle soreness and fatigue, has been experiencing intermittent chest pain at night, has had intermittent \"splitting\" headaches, a non-productive cough, and a sore throat. Notes that he has also been experiencing a 3/10 constant \"dull ache\" to both the right and left portions of the lower back. He saw his PCP for evaluation of his symptoms today and had blood work and UA performed. Patient states that his UA was positive for blood in the urine, but negative for infection. He is still waiting for the blood work results. Then tonight, patient felt the sudden onset of \"explosive\" and severe pain in the right flank. He describes the pain as feeling \"like a hammer hit\" that area, and he additionally complains of lightheadedness secondary to the severe pain. He rates his current level of discomfort as a 10/10 in severity, and he notes that he took a dose of Flexeril 1 hour ago without significant relief. Patient specifically denies fevers, chills, nausea, vomiting, shortness of breath, or known history of kidney stones. He notes that his  has been recently sick with cold-like symptoms. There are no other acute medical concerns at this time. Social hx: Denies Tobacco use; Positive EtOH use (occasional); Denies Illicit Drug Abuse PCP: Robbin Aldana MD 
Cardiology: Dr. Shy Rodriguez Note written by Percy Mcdaniel. Cristian Mock, as dictated by Indra Ramirez PA-C 8:49 PM  
 
 
 The history is provided by the patient. No  was used. Past Medical History:  
Diagnosis Date  Gout  Hypertension  Tachycardia   
 mild tachychardia with palpitations Past Surgical History:  
Procedure Laterality Date  HX SEPTOPLASTY Family History:  
Problem Relation Age of Onset  Diabetes Mother  Cancer Mother  Allergy-severe Mother   
     severe allergy to statins Social History Socioeconomic History  Marital status: UNKNOWN Spouse name: Not on file  Number of children: Not on file  Years of education: Not on file  Highest education level: Not on file Social Needs  Financial resource strain: Not on file  Food insecurity - worry: Not on file  Food insecurity - inability: Not on file  Transportation needs - medical: Not on file  Transportation needs - non-medical: Not on file Occupational History  Not on file Tobacco Use  Smoking status: Never Smoker  Smokeless tobacco: Never Used Substance and Sexual Activity  Alcohol use: Yes Comment: occationally  Drug use: No  
 Sexual activity: Yes  
  Partners: Female Birth control/protection: Condom Other Topics Concern  Not on file Social History Narrative  Not on file ALLERGIES: Pcn [penicillins] Review of Systems Constitutional: Positive for fatigue. Negative for chills and fever. HENT: Positive for sore throat. Negative for ear discharge. Eyes: Negative for photophobia, pain, discharge and visual disturbance. Respiratory: Positive for cough. Negative for apnea, chest tightness and shortness of breath. Cardiovascular: Positive for chest pain. Negative for palpitations and leg swelling. Gastrointestinal: Negative for abdominal distention, blood in stool, nausea and vomiting. Genitourinary: Positive for flank pain (right) and hematuria. Negative for difficulty urinating, dysuria and frequency. Musculoskeletal: Positive for back pain (lower) and myalgias. Negative for gait problem, joint swelling and neck pain. Skin: Negative for color change and pallor. Neurological: Positive for headaches. Negative for dizziness, syncope, weakness and numbness. Psychiatric/Behavioral: Negative for behavioral problems and confusion. The patient is not nervous/anxious. Vitals:  
 11/14/18 2030 11/14/18 2047 BP:  (!) 203/136 Pulse: (!) 112 96 Resp:  18 SpO2: 97% 98% Weight:  122.5 kg (270 lb) Height:  6' 1\" (1.854 m) Physical Exam  
Constitutional: He is oriented to person, place, and time. He appears well-developed. Patient is obese Appears to be in mild distress HENT:  
Head: Normocephalic and atraumatic. Right Ear: External ear normal.  
Left Ear: External ear normal.  
Nose: Nose normal.  
Mouth/Throat: Oropharynx is clear and moist.  
Eyes: Conjunctivae and EOM are normal. Pupils are equal, round, and reactive to light. Right eye exhibits no discharge. Left eye exhibits no discharge. Neck: Normal range of motion. Neck supple. Cardiovascular: Normal rate, regular rhythm, normal heart sounds and intact distal pulses. Pulmonary/Chest: Effort normal and breath sounds normal.  
Abdominal: Soft. Bowel sounds are normal. He exhibits no distension. There is no tenderness. There is no rebound and no guarding. There is right flank tenderness Musculoskeletal: Normal range of motion. He exhibits no edema or tenderness. Neurological: He is alert and oriented to person, place, and time. No cranial nerve deficit. Coordination normal.  
Skin: Skin is warm and dry. No rash noted. Psychiatric: He has a normal mood and affect. His behavior is normal. Judgment and thought content normal.  
Nursing note and vitals reviewed. Note written by Chet Stone. Leona Lamb, as dictated by Tristin Licona PA-C 8:49 PM   
 
MDM Number of Diagnoses or Management Options Elevated troponin:  
Right kidney stone:  
Diagnosis management comments: 40 yo male with flank and back pain; also with fatigue and intermittent CP; noted to have +trop and kidney stone; will admit for Hernando JODI Nicholas Amount and/or Complexity of Data Reviewed Clinical lab tests: ordered and reviewed Tests in the radiology section of CPT®: ordered and reviewed Discuss the patient with other providers: yes Independent visualization of images, tracings, or specimens: yes Procedures Patient has been reassessed. Feeling much better. Awaiting labs. JODI Mcadams Pt noted to have elevated Trop; states pain 2-3 days ago; no current CP; pt will admit. JODI Mcadams Discussed case with attending Physician Albert Aguilar. JODI Mcadams Tiger text Dr Lisa Mckinney for admission.  JODI Mcadams

## 2018-11-16 ENCOUNTER — PATIENT OUTREACH (OUTPATIENT)
Dept: PRIMARY CARE CLINIC | Age: 43
End: 2018-11-16

## 2018-11-16 LAB
BACTERIA SPEC CULT: NORMAL
SERVICE CMNT-IMP: NORMAL

## 2018-11-16 NOTE — PROGRESS NOTES
NN noted patient on daily discharge report. EMR reviewed. NN contacted patient for follow up. Unable to reach patient at this time and left a VM for a return call with NN's contact info provided.

## 2018-11-20 ENCOUNTER — PATIENT OUTREACH (OUTPATIENT)
Dept: PRIMARY CARE CLINIC | Age: 43
End: 2018-11-20

## 2018-11-20 NOTE — PROGRESS NOTES
NN second attempt to reach patient post hospitalization for follow up. NN unable to reach patient by phone and left a VM for return call with NN's contact info provided.

## 2018-12-05 DIAGNOSIS — R31.29 HEMATURIA, MICROSCOPIC: ICD-10-CM

## 2018-12-05 DIAGNOSIS — M54.50 ACUTE MIDLINE LOW BACK PAIN WITHOUT SCIATICA: ICD-10-CM

## 2018-12-05 RX ORDER — CYCLOBENZAPRINE HCL 10 MG
TABLET ORAL
Qty: 15 TAB | Refills: 0 | Status: SHIPPED | OUTPATIENT
Start: 2018-12-05 | End: 2019-04-01

## 2018-12-11 ENCOUNTER — APPOINTMENT (OUTPATIENT)
Dept: CT IMAGING | Age: 43
End: 2018-12-11
Attending: PHYSICIAN ASSISTANT
Payer: COMMERCIAL

## 2018-12-11 LAB
BASOPHILS # BLD: 0.1 K/UL (ref 0–0.1)
BASOPHILS NFR BLD: 1 % (ref 0–1)
DIFFERENTIAL METHOD BLD: NORMAL
EOSINOPHIL # BLD: 0.2 K/UL (ref 0–0.4)
EOSINOPHIL NFR BLD: 3 % (ref 0–7)
ERYTHROCYTE [DISTWIDTH] IN BLOOD BY AUTOMATED COUNT: 11.5 % (ref 11.5–14.5)
HCT VFR BLD AUTO: 38.9 % (ref 36.6–50.3)
HGB BLD-MCNC: 13.8 G/DL (ref 12.1–17)
IMM GRANULOCYTES # BLD: 0 K/UL (ref 0–0.04)
IMM GRANULOCYTES NFR BLD AUTO: 0 % (ref 0–0.5)
LYMPHOCYTES # BLD: 1.5 K/UL (ref 0.8–3.5)
LYMPHOCYTES NFR BLD: 21 % (ref 12–49)
MCH RBC QN AUTO: 32.3 PG (ref 26–34)
MCHC RBC AUTO-ENTMCNC: 35.5 G/DL (ref 30–36.5)
MCV RBC AUTO: 91.1 FL (ref 80–99)
MONOCYTES # BLD: 0.7 K/UL (ref 0–1)
MONOCYTES NFR BLD: 10 % (ref 5–13)
NEUTS SEG # BLD: 4.6 K/UL (ref 1.8–8)
NEUTS SEG NFR BLD: 65 % (ref 32–75)
NRBC # BLD: 0 K/UL (ref 0–0.01)
NRBC BLD-RTO: 0 PER 100 WBC
PLATELET # BLD AUTO: 196 K/UL (ref 150–400)
PMV BLD AUTO: 10 FL (ref 8.9–12.9)
RBC # BLD AUTO: 4.27 M/UL (ref 4.1–5.7)
WBC # BLD AUTO: 7.1 K/UL (ref 4.1–11.1)

## 2018-12-11 PROCEDURE — 85025 COMPLETE CBC W/AUTO DIFF WBC: CPT

## 2018-12-11 PROCEDURE — 74176 CT ABD & PELVIS W/O CONTRAST: CPT

## 2018-12-11 PROCEDURE — 99283 EMERGENCY DEPT VISIT LOW MDM: CPT

## 2018-12-11 PROCEDURE — 36415 COLL VENOUS BLD VENIPUNCTURE: CPT

## 2018-12-11 PROCEDURE — 80053 COMPREHEN METABOLIC PANEL: CPT

## 2018-12-11 RX ORDER — HYDROMORPHONE HYDROCHLORIDE 2 MG/ML
1 INJECTION, SOLUTION INTRAMUSCULAR; INTRAVENOUS; SUBCUTANEOUS ONCE
Status: DISCONTINUED | OUTPATIENT
Start: 2018-12-11 | End: 2018-12-12 | Stop reason: HOSPADM

## 2018-12-11 RX ORDER — ONDANSETRON 2 MG/ML
4 INJECTION INTRAMUSCULAR; INTRAVENOUS
Status: DISCONTINUED | OUTPATIENT
Start: 2018-12-11 | End: 2018-12-12 | Stop reason: HOSPADM

## 2018-12-11 RX ORDER — KETOROLAC TROMETHAMINE 30 MG/ML
30 INJECTION, SOLUTION INTRAMUSCULAR; INTRAVENOUS
Status: DISCONTINUED | OUTPATIENT
Start: 2018-12-11 | End: 2018-12-12 | Stop reason: HOSPADM

## 2018-12-12 ENCOUNTER — HOSPITAL ENCOUNTER (EMERGENCY)
Age: 43
Discharge: HOME | End: 2018-12-12
Attending: STUDENT IN AN ORGANIZED HEALTH CARE EDUCATION/TRAINING PROGRAM
Payer: COMMERCIAL

## 2018-12-12 VITALS
SYSTOLIC BLOOD PRESSURE: 133 MMHG | HEART RATE: 103 BPM | RESPIRATION RATE: 18 BRPM | DIASTOLIC BLOOD PRESSURE: 83 MMHG | TEMPERATURE: 98 F | OXYGEN SATURATION: 95 %

## 2018-12-12 DIAGNOSIS — N20.1 RIGHT URETERAL STONE: Primary | ICD-10-CM

## 2018-12-12 LAB
ALBUMIN SERPL-MCNC: 4.1 G/DL (ref 3.5–5)
ALBUMIN/GLOB SERPL: 1.2 {RATIO} (ref 1.1–2.2)
ALP SERPL-CCNC: 41 U/L (ref 45–117)
ALT SERPL-CCNC: 67 U/L (ref 12–78)
ANION GAP SERPL CALC-SCNC: 9 MMOL/L (ref 5–15)
APPEARANCE UR: ABNORMAL
AST SERPL-CCNC: 39 U/L (ref 15–37)
BACTERIA URNS QL MICRO: NEGATIVE /HPF
BILIRUB SERPL-MCNC: 0.4 MG/DL (ref 0.2–1)
BILIRUB UR QL: NEGATIVE
BUN SERPL-MCNC: 13 MG/DL (ref 6–20)
BUN/CREAT SERPL: 8 (ref 12–20)
CALCIUM SERPL-MCNC: 8.8 MG/DL (ref 8.5–10.1)
CHLORIDE SERPL-SCNC: 106 MMOL/L (ref 97–108)
CO2 SERPL-SCNC: 23 MMOL/L (ref 21–32)
COLOR UR: ABNORMAL
CREAT SERPL-MCNC: 1.56 MG/DL (ref 0.7–1.3)
EPITH CASTS URNS QL MICRO: ABNORMAL /LPF
GLOBULIN SER CALC-MCNC: 3.4 G/DL (ref 2–4)
GLUCOSE SERPL-MCNC: 127 MG/DL (ref 65–100)
GLUCOSE UR STRIP.AUTO-MCNC: NEGATIVE MG/DL
HGB UR QL STRIP: ABNORMAL
HYALINE CASTS URNS QL MICRO: ABNORMAL /LPF (ref 0–5)
KETONES UR QL STRIP.AUTO: NEGATIVE MG/DL
LEUKOCYTE ESTERASE UR QL STRIP.AUTO: NEGATIVE
NITRITE UR QL STRIP.AUTO: NEGATIVE
PH UR STRIP: 6.5 [PH] (ref 5–8)
POTASSIUM SERPL-SCNC: 3.9 MMOL/L (ref 3.5–5.1)
PROT SERPL-MCNC: 7.5 G/DL (ref 6.4–8.2)
PROT UR STRIP-MCNC: NEGATIVE MG/DL
RBC #/AREA URNS HPF: >100 /HPF (ref 0–5)
SODIUM SERPL-SCNC: 138 MMOL/L (ref 136–145)
SP GR UR REFRACTOMETRY: 1.02 (ref 1–1.03)
UROBILINOGEN UR QL STRIP.AUTO: 1 EU/DL (ref 0.2–1)
WBC URNS QL MICRO: ABNORMAL /HPF (ref 0–4)

## 2018-12-12 PROCEDURE — 81001 URINALYSIS AUTO W/SCOPE: CPT

## 2018-12-12 RX ORDER — OXYCODONE AND ACETAMINOPHEN 10; 325 MG/1; MG/1
1 TABLET ORAL
Qty: 12 TAB | Refills: 0 | Status: SHIPPED | OUTPATIENT
Start: 2018-12-12 | End: 2021-11-01 | Stop reason: ALTCHOICE

## 2018-12-12 RX ORDER — ONDANSETRON 4 MG/1
4 TABLET, ORALLY DISINTEGRATING ORAL
Qty: 12 TAB | Refills: 0 | Status: SHIPPED | OUTPATIENT
Start: 2018-12-12 | End: 2021-11-01 | Stop reason: ALTCHOICE

## 2018-12-12 NOTE — ED TRIAGE NOTES
Pt c/o of  R. Flank pain starting to day. 10/325mg lortab self administed by pt. Pt appears to be in pain.

## 2018-12-12 NOTE — ED PROVIDER NOTES
Rt flank pain since about noon, intermittent, with associated bladder pain. UOP normal but some pain. Took 10/325 mg hydrocodone with minimal improvement. Took toradol 10 mg at 1400, flexeril 10 mg at 1800 and 1/4 bottle of whskey with minimal improvement. Associated nausea with abdominal pain presumably secondary to clenching. No fever, chills, headache, dizziness, CP, SOB, or bowel pattern cahnges             Past Medical History:   Diagnosis Date    Gout     Hypertension     Tachycardia     mild tachychardia with palpitations       Past Surgical History:   Procedure Laterality Date    HX SEPTOPLASTY           Family History:   Problem Relation Age of Onset    Diabetes Mother     Cancer Mother     Allergy-severe Mother         severe allergy to statins       Social History     Socioeconomic History    Marital status: UNKNOWN     Spouse name: Not on file    Number of children: Not on file    Years of education: Not on file    Highest education level: Not on file   Social Needs    Financial resource strain: Not on file    Food insecurity - worry: Not on file    Food insecurity - inability: Not on file   [employer] needs - medical: Not on file   [employer] needs - non-medical: Not on file   Occupational History    Not on file   Tobacco Use    Smoking status: Never Smoker    Smokeless tobacco: Never Used   Substance and Sexual Activity    Alcohol use: Yes     Comment: occationally    Drug use: No    Sexual activity: Yes     Partners: Female     Birth control/protection: Condom   Other Topics Concern    Not on file   Social History Narrative    Not on file         ALLERGIES: Pcn [penicillins]    Review of Systems   Constitutional: Negative. Negative for chills and fever. HENT: Negative for congestion, ear pain, rhinorrhea and sore throat. Eyes: Negative. Respiratory: Negative for cough and shortness of breath. Cardiovascular: Negative for chest pain and palpitations. Gastrointestinal: Positive for abdominal pain. Negative for constipation, diarrhea and vomiting. Genitourinary: Positive for flank pain (rt) and hematuria. Negative for difficulty urinating, dysuria, frequency and urgency. Neurological: Negative for numbness and headaches. All other systems reviewed and are negative. Vitals:    12/11/18 2157   SpO2: 97%            Physical Exam   Constitutional: He is oriented to person, place, and time. He appears well-developed and well-nourished. No distress.  adult male in NAD   HENT:   Head: Normocephalic and atraumatic. Right Ear: External ear normal.   Left Ear: External ear normal.   Nose: Nose normal.   Mouth/Throat: Oropharynx is clear and moist. No oropharyngeal exudate. Eyes: Conjunctivae and EOM are normal. Pupils are equal, round, and reactive to light. Right eye exhibits no discharge. Left eye exhibits no discharge. Neck: Normal range of motion. Neck supple. Cardiovascular: Normal rate, regular rhythm and normal heart sounds. Pulmonary/Chest: Effort normal and breath sounds normal. He has no wheezes. He has no rales. Abdominal: Soft. Bowel sounds are normal. He exhibits no distension. There is no tenderness. There is no guarding. Mild rt CVA tenderness   Musculoskeletal: Normal range of motion. Lymphadenopathy:     He has no cervical adenopathy. Neurological: He is alert and oriented to person, place, and time. No cranial nerve deficit. Skin: Skin is warm and dry. He is not diaphoretic. Psychiatric: He has a normal mood and affect. His behavior is normal.   Nursing note and vitals reviewed. MDM  Number of Diagnoses or Management Options  Right ureteral stone:   Diagnosis management comments: 40 yo  male with hx of renal colic with rt flank pain. Concern for obstructive uropathy vs pyelo vs pancreatitis vs obstipation amongst others  PLan  CBC  CMP  UA  Ct abd/pel  Analgesia.  Nanda NATHAN [place], [place] Amount and/or Complexity of Data Reviewed  Clinical lab tests: ordered and reviewed  Tests in the radiology section of CPT®: ordered and reviewed  Independent visualization of images, tracings, or specimens: yes           Procedures    Labs and imaging reviewed. UA clear. Rt distal ureter stone. JODI Colon  Pain improved. Pt feeling better. Has appt with urology tomorrow. Nanda Dumont [place]    Patient's results have been reviewed with them. Patient and/or family have verbally conveyed their understanding and agreement of the patient's signs, symptoms, diagnosis, treatment and prognosis and additionally agree to follow up as recommended or return to the Emergency Room should their condition change prior to follow-up. Discharge instructions have also been provided to the patient with some educational information regarding their diagnosis as well a list of reasons why they would want to return to the ER prior to their follow-up appointment should their condition change.  Nanda Liz [place]

## 2018-12-12 NOTE — DISCHARGE INSTRUCTIONS
Kidney Stone: Care Instructions  Your Care Instructions    Kidney stones are formed when salts, minerals, and other substances normally found in the urine clump together. They can be as small as grains of sand or, rarely, as large as golf balls. While the stone is traveling through the ureter, which is the tube that carries urine from the kidney to the bladder, you will probably feel pain. The pain may be mild or very severe. You may also have some blood in your urine. As soon as the stone reaches the bladder, any intense pain should go away. If a stone is too large to pass on its own, you may need a medical procedure to help you pass the stone. The doctor has checked you carefully, but problems can develop later. If you notice any problems or new symptoms, get medical treatment right away. Follow-up care is a key part of your treatment and safety. Be sure to make and go to all appointments, and call your doctor if you are having problems. It's also a good idea to know your test results and keep a list of the medicines you take. How can you care for yourself at home? · Drink plenty of fluids, enough so that your urine is light yellow or clear like water. If you have kidney, heart, or liver disease and have to limit fluids, talk with your doctor before you increase the amount of fluids you drink. · Take pain medicines exactly as directed. Call your doctor if you think you are having a problem with your medicine. ? If the doctor gave you a prescription medicine for pain, take it as prescribed. ? If you are not taking a prescription pain medicine, ask your doctor if you can take an over-the-counter medicine. Read and follow all instructions on the label. · Your doctor may ask you to strain your urine so that you can collect your kidney stone when it passes. You can use a kitchen strainer or a tea strainer to catch the stone. Store it in a plastic bag until you see your doctor again.   Preventing future kidney stones  Some changes in your diet may help prevent kidney stones. Depending on the cause of your stones, your doctor may recommend that you:  · Drink plenty of fluids, enough so that your urine is light yellow or clear like water. If you have kidney, heart, or liver disease and have to limit fluids, talk with your doctor before you increase the amount of fluids you drink. · Limit coffee, tea, and alcohol. Also avoid grapefruit juice. · Do not take more than the recommended daily dose of vitamins C and D.  · Avoid antacids such as Gaviscon, Maalox, Mylanta, or Tums. · Limit the amount of salt (sodium) in your diet. · Eat a balanced diet that is not too high in protein. · Limit foods that are high in a substance called oxalate, which can cause kidney stones. These foods include dark green vegetables, rhubarb, chocolate, wheat bran, nuts, cranberries, and beans. When should you call for help? Call your doctor now or seek immediate medical care if:    · You cannot keep down fluids.     · Your pain gets worse.     · You have a fever or chills.     · You have new or worse pain in your back just below your rib cage (the flank area).     · You have new or more blood in your urine.    Watch closely for changes in your health, and be sure to contact your doctor if:    · You do not get better as expected. Where can you learn more? Go to http://gila-nathaly.info/. Enter S421 in the search box to learn more about \"Kidney Stone: Care Instructions. \"  Current as of: March 15, 2018  Content Version: 11.8  © 7490-4912 [employer]. Care instructions adapted under license by [employer] (which disclaims liability or warranty for this information). If you have questions about a medical condition or this instruction, always ask your healthcare professional. [address] any warranty or liability for your use of this information.

## 2019-01-25 RX ORDER — FEBUXOSTAT 40 MG/1
TABLET ORAL
Qty: 90 TAB | Refills: 0 | Status: SHIPPED | OUTPATIENT
Start: 2019-01-25 | End: 2019-04-28 | Stop reason: SDUPTHER

## 2019-04-01 ENCOUNTER — OFFICE VISIT (OUTPATIENT)
Dept: PRIMARY CARE CLINIC | Age: 44
End: 2019-04-01

## 2019-04-01 VITALS
HEART RATE: 105 BPM | SYSTOLIC BLOOD PRESSURE: 136 MMHG | DIASTOLIC BLOOD PRESSURE: 83 MMHG | WEIGHT: 257.8 LBS | BODY MASS INDEX: 34.17 KG/M2 | HEIGHT: 73 IN | OXYGEN SATURATION: 97 % | RESPIRATION RATE: 16 BRPM | TEMPERATURE: 97.8 F

## 2019-04-01 DIAGNOSIS — R10.31 GROIN PAIN, RIGHT: Primary | ICD-10-CM

## 2019-04-01 DIAGNOSIS — N50.811 RIGHT TESTICULAR PAIN: ICD-10-CM

## 2019-04-01 RX ORDER — CYCLOBENZAPRINE HCL 10 MG
TABLET ORAL
Qty: 15 TAB | Refills: 0 | Status: SHIPPED | OUTPATIENT
Start: 2019-04-01 | End: 2021-11-01 | Stop reason: ALTCHOICE

## 2019-04-01 NOTE — PROGRESS NOTES
Chief Complaint   Patient presents with    Groin Pain     states he thinks the event happened about a month ago and thinks he aggrevated it this weekend. isnt sure what he should do for a pulled groin.

## 2019-04-01 NOTE — LETTER
NOTIFICATION OF RETURN TO WORK / SCHOOL 
4/1/2019 Mr. Cynthia Torres [address] [place] [address] To Whom It May Concern: 
 
Cynthia Torres was under the care of [facility]. He will return to work on 4/4/19 if feeling better. If there are questions or concerns please have the patient contact our office. Sincerely, Marina Pascual NP

## 2019-04-01 NOTE — PROGRESS NOTES
[facility]   [address] [facility], [address] [place] [address]  P: [phone]  F: [phone]      Chief Complaint   Patient presents with    Groin Pain     states he thinks the event happened about a month ago and thinks he aggrevated it this weekend. isnt sure what he should do for a pulled groin. Deidre Aguirre is a 41 y.o. male who presents to clinic for Groin Pain (states he thinks the event happened about a month ago and thinks he aggrevated it this weekend. isnt sure what he should do for a pulled groin. ). HPI:    The patient presents for 1 month of groin pain with exacerbation of pain to the area over the last 3-4 days after lifting his daughter who weighs about 120lbs. He denies prior hernia and does not notice a bulge to his abdomen or groin. He endorses pain to posterior area of R testicle into right inguinal area, worse when standing and walking. He has taken 1 dose of 800mg motrin this AM and took a flexeril as well, which Dr Maude Moon previously prescribed for his low back pain. He is  and has a monogomous relationship with his wife. Last intercourse about 1 week ago. Denies a hx of prior STI/STD. Denies urinary or bowel changes today. 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 Patient Active Problem List    Diagnosis    Severe obesity ([address]    Hydronephrosis of right kidney    Right ureteral stone    Obesity (BMI 30-39. 9)    Essential hypertension    Idiopathic gout    Hypertriglyceridemia          Past Medical History:   Diagnosis Date    Flank pain 12/11/2018    Gout     Hypertension     Tachycardia     mild tachychardia with palpitations     Past Surgical History:   Procedure Laterality Date    HX SEPTOPLASTY       Social History     Socioeconomic History    Marital status: UNKNOWN     Spouse name: Not on file    Number of children: Not on file    Years of education: Not on file    Highest education level: Not on file   Occupational History    Not on file   Social Needs    Financial resource strain: Not on file    Food insecurity:     Worry: Not on file     Inability: Not on file    Transportation needs:     Medical: Not on file     Non-medical: Not on file   Tobacco Use    Smoking status: Never Smoker    Smokeless tobacco: Never Used   Substance and Sexual Activity    Alcohol use: Yes     Comment: occationally    Drug use: No    Sexual activity: Yes     Partners: Female     Birth control/protection: Condom   Lifestyle    Physical activity:     Days per week: Not on file     Minutes per session: Not on file    Stress: Not on file   Relationships    Social connections:     Talks on phone: Not on file     Gets together: Not on file     Attends Denominational service: Not on file     Active member of club or organization: Not on file     Attends meetings of clubs or organizations: Not on file     Relationship status: Not on file    Intimate partner violence:     Fear of current or ex partner: Not on file     Emotionally abused: Not on file     Physically abused: Not on file     Forced sexual activity: Not on file   Other Topics Concern    Not on file   Social History Narrative    Not on file     Family History   Problem Relation Age of Onset    Diabetes Mother     Cancer Mother     Allergy-severe Mother         severe allergy to statins     Allergies   Allergen Reactions    Pcn [Penicillins] Hives, Shortness of Breath and Swelling       Current Outpatient Medications   Medication Sig Dispense Refill    cyclobenzaprine (FLEXERIL) 10 mg tablet TAKE 1 TABLET BY MOUTH NIGHTLY FOR 15 DAYS 15 Tab 0    ULORIC 40 mg tab tablet TAKE 1 TABLET BY MOUTH EVERY DAY 90 Tab 0    fenofibrate nanocrystallized (TRICOR) 145 mg tablet TAKE 1 TABLET BY ORAL ROUTE EVERY DAY 90 Tab 0    atorvastatin (LIPITOR) 20 mg tablet Take 1 Tab by mouth daily. 90 Tab 2    esomeprazole (NEXIUM) 40 mg capsule Take  by mouth daily.       oxyCODONE-acetaminophen (PERCOCET)  mg per tablet Take 1 Tab by mouth every six (6) hours as needed for Pain. Max Daily Amount: 4 Tabs. 12 Tab 0    ondansetron (ZOFRAN ODT) 4 mg disintegrating tablet Take 1 Tab by mouth every eight (8) hours as needed for Nausea. 12 Tab 0    ketorolac (TORADOL) 10 mg tablet Take 1 Tab by mouth every six (6) hours as needed for Pain. 15 Tab 0    tamsulosin (FLOMAX) 0.4 mg capsule Take 1 Cap by mouth daily. 30 Cap 0    levoFLOXacin (LEVAQUIN) 250 mg tablet Take 1 Tab by mouth every twenty-four (24) hours. 7 Tab 0    nebivolol (BYSTOLIC) 5 mg tablet Take 1 Tab by mouth daily. 90 Tab 2    omega-3 fatty acids-vitamin e 1,000 mg cap Take 1 Cap by mouth.  b complex vitamins tablet Take 1 Tab by mouth daily. The medications were reviewed and updated in the medical record. The past medical history, past surgical history, and family history were reviewed and updated in the medical record. REVIEW OF SYSTEMS   Review of Systems   Constitutional: Negative for malaise/fatigue. HENT: Negative for congestion. Eyes: Negative for blurred vision and pain. Respiratory: Negative for cough and shortness of breath. Cardiovascular: Negative for chest pain and palpitations. Gastrointestinal: Negative for abdominal pain and heartburn. Genitourinary: Negative for frequency and urgency. Musculoskeletal: Positive for myalgias (groin). Negative for joint pain. Neurological: Negative for dizziness, tingling, sensory change, weakness and headaches. Psychiatric/Behavioral: Negative for depression, memory loss and substance abuse.      PHYSICAL EXAM     Visit Vitals  /83 (BP 1 Location: Right arm, BP Patient Position: Sitting)   Pulse (!) 105   Temp 97.8 °F (36.6 °C) (Oral)   Resp 16   Ht 6' 1\" (1.854 m)   Wt 257 lb 12.8 oz (116.9 kg)   SpO2 97%   BMI 34.01 kg/m²       Physical Exam   Constitutional: He is oriented to person, place, and time and well-developed, well-nourished, and in no distress. HENT:   Head: Normocephalic and atraumatic. Right Ear: External ear normal.   Left Ear: External ear normal.   Cardiovascular: Normal rate, regular rhythm and normal heart sounds. Pulmonary/Chest: Effort normal and breath sounds normal.   Genitourinary: Penis normal. He exhibits epididymal tenderness (R groin/ posterior R testicle). He exhibits no abnormal testicular mass, no testicular tenderness and no abnormal scrotal mass. Penis exhibits no lesions and no edema. Genitourinary Comments: Hernia check: Neg for inguinal hernia bilaterally. No obvious bulging area to groin or abdomen. Musculoskeletal: Normal range of motion. He exhibits no edema. Neurological: He is alert and oriented to person, place, and time. Gait normal.   Skin: Skin is warm and dry. Psychiatric: Affect and judgment normal.   Nursing note and vitals reviewed. ASSESSMENT/ PLAN   Diagnoses and all orders for this visit:    1. Groin pain, right         - Discussed that he could have an occult hernia vs epididymitis vs muscle pull. Patient wishes to take conservative approach and try ibuprofen and muscle relaxer as he believes he might have just pulled a muscle. -  Encouraged rest for 3 days, NSAID, and to return to discuss imaging vs empiric antibiotic if not better by end of the week. Return to work letter provided. -     cyclobenzaprine (FLEXERIL) 10 mg tablet; TAKE 1 TABLET BY MOUTH NIGHTLY FOR 15 DAY    2. Right testicular pain  -     cyclobenzaprine (FLEXERIL) 10 mg tablet; TAKE 1 TABLET BY MOUTH NIGHTLY FOR 15 DAYS     Disclaimer:  Advised patient to call back or return to office if symptoms worsen/change/persist.  Discussed expected course/resolution/complications of diagnosis in detail with patient.     Medication risks/benefits/alternatives discussed with patient. Patient was given an after visit summary which includes diagnoses, current medications, & vitals.    Discussed patient instructions and advised to read to all patient instructions regarding care.      Patient expressed understanding with the diagnosis and plan. This note will not be viewable in [address]         Omari Ortega NP  4/1/2019        (This document has been electronically signed)

## 2019-04-29 RX ORDER — FEBUXOSTAT 40 MG/1
TABLET ORAL
Qty: 90 TAB | Refills: 0 | Status: SHIPPED | OUTPATIENT
Start: 2019-04-29 | End: 2019-07-27 | Stop reason: SDUPTHER

## 2019-06-05 ENCOUNTER — TELEPHONE (OUTPATIENT)
Dept: PRIMARY CARE CLINIC | Age: 44
End: 2019-06-05

## 2019-06-05 DIAGNOSIS — L23.7 POISON IVY: Primary | ICD-10-CM

## 2019-06-05 RX ORDER — HYDROCORTISONE 25 MG/G
CREAM TOPICAL 2 TIMES DAILY
Qty: 30 G | Refills: 0 | Status: SHIPPED | OUTPATIENT
Start: 2019-06-05

## 2019-06-05 NOTE — TELEPHONE ENCOUNTER
Pt wa exposed to poision ivy and is having an extreme reaction and would like to have some steroid creme called into the pharmacy before it gets too bad.

## 2019-06-05 NOTE — TELEPHONE ENCOUNTER
Attempted to call patient to advise steroid called into pharmacy and offer Thursday appointment and mailbox is full. End of encounter.

## 2019-06-05 NOTE — TELEPHONE ENCOUNTER
Steroid cream sent to the pharmacy but if get worse , he can come tomorrow morning. I can double book him.

## 2019-06-06 ENCOUNTER — OFFICE VISIT (OUTPATIENT)
Dept: PRIMARY CARE CLINIC | Age: 44
End: 2019-06-06

## 2019-06-06 VITALS
SYSTOLIC BLOOD PRESSURE: 113 MMHG | WEIGHT: 243.2 LBS | HEIGHT: 73 IN | BODY MASS INDEX: 32.23 KG/M2 | RESPIRATION RATE: 16 BRPM | OXYGEN SATURATION: 97 % | TEMPERATURE: 98.1 F | HEART RATE: 106 BPM | DIASTOLIC BLOOD PRESSURE: 76 MMHG

## 2019-06-06 DIAGNOSIS — L23.7 POISON IVY: Primary | ICD-10-CM

## 2019-06-06 DIAGNOSIS — L29.9 ITCHING: ICD-10-CM

## 2019-06-06 RX ORDER — HYDROXYZINE PAMOATE 25 MG/1
25 CAPSULE ORAL
Qty: 20 CAP | Refills: 0 | Status: SHIPPED | OUTPATIENT
Start: 2019-06-06 | End: 2019-06-20

## 2019-06-06 RX ORDER — PREDNISONE 10 MG/1
TABLET ORAL
Qty: 21 TAB | Refills: 0 | Status: SHIPPED | OUTPATIENT
Start: 2019-06-06 | End: 2021-11-01 | Stop reason: ALTCHOICE

## 2019-06-06 NOTE — PROGRESS NOTES
[facility]   [address], [address] [facility]  P: [phone]  F: [phone]      Chief Complaint   Patient presents with    Skin Problem     unknown when it started but it is spreading and has been bad for about a week. Estrella Olson is a 41 y.o. male who presents to clinic for Skin Problem (unknown when it started but it is spreading and has been bad for about a week. ). HPI:    Claudean Brookes presents with 1 week of worsening poison ivy, after being called in Hytone steroid cream by Dr. Ronda Fernandez. He endorses itching and discomfort to right posterior knee and thigh, but notes it was an obvious spread to his left wrist and to his right arm. Patient Active Problem List    Diagnosis    Severe obesity ([address]    Hydronephrosis of right kidney    Right ureteral stone    Obesity (BMI 30-39. 9)    Essential hypertension    Idiopathic gout    Hypertriglyceridemia          Past Medical History:   Diagnosis Date    Flank pain 12/11/2018    Gout     Hypertension     Tachycardia     mild tachychardia with palpitations     Past Surgical History:   Procedure Laterality Date    HX SEPTOPLASTY       Social History     Socioeconomic History    Marital status: UNKNOWN     Spouse name: Not on file    Number of children: Not on file    Years of education: Not on file    Highest education level: Not on file   Occupational History    Not on file   Social Needs    Financial resource strain: Not on file    Food insecurity:     Worry: Not on file     Inability: Not on file    Transportation needs:     Medical: Not on file     Non-medical: Not on file   Tobacco Use    Smoking status: Never Smoker    Smokeless tobacco: Never Used   Substance and Sexual Activity    Alcohol use: Yes     Comment: occationally    Drug use: No    Sexual activity: Yes     Partners: Female     Birth control/protection: Condom   Lifestyle    Physical activity:     Days per week: Not on file Minutes per session: Not on file    Stress: Not on file   Relationships    Social connections:     Talks on phone: Not on file     Gets together: Not on file     Attends [employer] service: Not on file     Active member of club or organization: Not on file     Attends meetings of clubs or organizations: Not on file     Relationship status: Not on file    Intimate partner violence:     Fear of current or ex partner: Not on file     Emotionally abused: Not on file     Physically abused: Not on file     Forced sexual activity: Not on file   Other Topics Concern    Not on file   Social History Narrative    Not on file     Family History   Problem Relation Age of Onset    Diabetes Mother     Cancer Mother     Allergy-severe Mother         severe allergy to statins     Allergies   Allergen Reactions    Pcn [Penicillins] Hives, Shortness of Breath and Swelling       Current Outpatient Medications   Medication Sig Dispense Refill    predniSONE (STERAPRED DS) 10 mg dose pack See administration instruction per 10mg dose pack 21 Tab 0    hydrOXYzine pamoate (VISTARIL) 25 mg capsule Take 1 Cap by mouth three (3) times daily as needed for Itching for up to 14 days. 20 Cap 0    omega-3 fatty acids-vitamin e 1,000 mg cap Take 1 Cap by mouth.  hydrocortisone (HYTONE) 2.5 % topical cream Apply  to affected area two (2) times a day. use thin layer 30 g 0    ULORIC 40 mg tab tablet TAKE 1 TABLET BY MOUTH EVERY DAY 90 Tab 0    cyclobenzaprine (FLEXERIL) 10 mg tablet TAKE 1 TABLET BY MOUTH NIGHTLY FOR 15 DAYS 15 Tab 0    oxyCODONE-acetaminophen (PERCOCET)  mg per tablet Take 1 Tab by mouth every six (6) hours as needed for Pain. Max Daily Amount: 4 Tabs. 12 Tab 0    ondansetron (ZOFRAN ODT) 4 mg disintegrating tablet Take 1 Tab by mouth every eight (8) hours as needed for Nausea. 12 Tab 0    ketorolac (TORADOL) 10 mg tablet Take 1 Tab by mouth every six (6) hours as needed for Pain.  15 Tab 0    tamsulosin (FLOMAX) 0.4 mg capsule Take 1 Cap by mouth daily. 30 Cap 0    fenofibrate nanocrystallized (TRICOR) 145 mg tablet TAKE 1 TABLET BY ORAL ROUTE EVERY DAY 90 Tab 0    nebivolol (BYSTOLIC) 5 mg tablet Take 1 Tab by mouth daily. 90 Tab 2    atorvastatin (LIPITOR) 20 mg tablet Take 1 Tab by mouth daily. 90 Tab 2    esomeprazole (NEXIUM) 40 mg capsule Take  by mouth daily.  b complex vitamins tablet Take 1 Tab by mouth daily. The medications were reviewed and updated in the medical record. The past medical history, past surgical history, and family history were reviewed and updated in the medical record. REVIEW OF SYSTEMS   Review of Systems   Constitutional: Negative for malaise/fatigue. HENT: Negative for congestion. Eyes: Negative for blurred vision and pain. Respiratory: Negative for cough and shortness of breath. Cardiovascular: Negative for chest pain and palpitations. Gastrointestinal: Negative for abdominal pain and heartburn. Genitourinary: Negative for frequency and urgency. Musculoskeletal: Negative for joint pain and myalgias. Skin: Positive for itching and rash. Neurological: Negative for dizziness, tingling, sensory change, weakness and headaches. Psychiatric/Behavioral: Negative for depression, memory loss and substance abuse. PHYSICAL EXAM     Visit Vitals  /76 (BP 1 Location: Right arm, BP Patient Position: Sitting)   Pulse (!) 106   Temp 98.1 °F (36.7 °C) (Oral)   Resp 16   Ht 6' 1\" (1.854 m)   Wt 243 lb 3.2 oz (110.3 kg)   SpO2 97%   BMI 32.09 kg/m²       Physical Exam   Constitutional: He is oriented to person, place, and time and well-developed, well-nourished, and in no distress. HENT:   Head: Normocephalic and atraumatic. Right Ear: External ear normal.   Left Ear: External ear normal.   Cardiovascular: Regular rhythm and normal heart sounds. Tachycardia present.    Pulmonary/Chest: Effort normal and breath sounds normal. Musculoskeletal: Normal range of motion. He exhibits no edema. Neurological: He is alert and oriented to person, place, and time. Gait normal.   Skin: Skin is warm and dry. Rash noted. Vesicular rash present to right posterior knee extending up right lateral thigh. Psychiatric: Affect and judgment normal.   Nursing note and vitals reviewed. ASSESSMENT/ PLAN   Diagnoses and all orders for this visit:    1. Poison ivy  -     predniSONE (STERAPRED DS) 10 mg dose pack; See administration instruction per 10mg dose pack  -     hydrOXYzine pamoate (VISTARIL) 25 mg capsule; Take 1 Cap by mouth three (3) times daily as needed for Itching for up to 14 days. 2. Itching  -     hydrOXYzine pamoate (VISTARIL) 25 mg capsule; Take 1 Cap by mouth three (3) times daily as needed for Itching for up to 14 days. Encouraged topical calamine lotion, and to take hydroxyzine at night as it is sedating. Disclaimer:  Advised patient to call back or return to office if symptoms worsen/change/persist.  Discussed expected course/resolution/complications of diagnosis in detail with patient.     Medication risks/benefits/alternatives discussed with patient. Patient was given an after visit summary which includes diagnoses, current medications, & vitals.      Discussed patient instructions and advised to read to all patient instructions regarding care.      Patient expressed understanding with the diagnosis and plan. This note will not be viewable in [address]         Himanshu Avalos NP  6/6/2019        (This document has been electronically signed)

## 2019-06-06 NOTE — PROGRESS NOTES
Chief Complaint   Patient presents with    Skin Problem     unknown when it started but it is spreading and has been bad for about a week.

## 2019-07-29 RX ORDER — FEBUXOSTAT 40 MG/1
TABLET ORAL
Qty: 90 TAB | Refills: 0 | Status: SHIPPED | OUTPATIENT
Start: 2019-07-29 | End: 2021-11-01 | Stop reason: ALTCHOICE

## 2021-11-01 ENCOUNTER — OFFICE VISIT (OUTPATIENT)
Dept: PRIMARY CARE CLINIC | Age: 46
End: 2021-11-01
Payer: COMMERCIAL

## 2021-11-01 VITALS
DIASTOLIC BLOOD PRESSURE: 86 MMHG | HEIGHT: 73 IN | HEART RATE: 94 BPM | SYSTOLIC BLOOD PRESSURE: 138 MMHG | TEMPERATURE: 98 F | RESPIRATION RATE: 18 BRPM | WEIGHT: 260 LBS | OXYGEN SATURATION: 97 % | BODY MASS INDEX: 34.46 KG/M2

## 2021-11-01 DIAGNOSIS — E78.1 HYPERTRIGLYCERIDEMIA: ICD-10-CM

## 2021-11-01 DIAGNOSIS — M10.00 IDIOPATHIC GOUT, UNSPECIFIED CHRONICITY, UNSPECIFIED SITE: ICD-10-CM

## 2021-11-01 DIAGNOSIS — R00.0 TACHYCARDIA: Primary | ICD-10-CM

## 2021-11-01 DIAGNOSIS — E66.9 OBESITY (BMI 30-39.9): ICD-10-CM

## 2021-11-01 DIAGNOSIS — Z86.16 HISTORY OF COVID-19: ICD-10-CM

## 2021-11-01 PROBLEM — N13.30 HYDRONEPHROSIS OF RIGHT KIDNEY: Status: RESOLVED | Noted: 2018-11-14 | Resolved: 2021-11-01

## 2021-11-01 PROCEDURE — 99214 OFFICE O/P EST MOD 30 MIN: CPT | Performed by: INTERNAL MEDICINE

## 2021-11-01 RX ORDER — ALLOPURINOL 300 MG/1
300 TABLET ORAL DAILY
Qty: 90 TABLET | Refills: 0 | Status: SHIPPED | OUTPATIENT
Start: 2021-11-01 | End: 2021-12-27

## 2021-11-01 NOTE — PROGRESS NOTES
Soumya Morris (: 1975) is a 42 y.o. male, established patient, here for evaluation of the following chief complaint(s):  Medication Refill and Labs     Written by Topher Granados, as dictated by Dr. Kimani Menendez MD.      ASSESSMENT/PLAN:  Below is the assessment and plan developed based on review of pertinent history, physical exam, labs, studies, and medications. 1. Tachycardia  Ordered labs to check metabolic panel and CBC levels. Waiting for results. -     METABOLIC PANEL, COMPREHENSIVE; Future  -     CBC W/O DIFF; Future    2. Obesity (BMI 30-39. 9)  Advised pt to purchase a FitBit or similar device. Discussed that a healthy lifestyle requires 5,000-7,000 steps daily, but weight loss requires 10,000 steps daily. 3. Idiopathic gout, unspecified chronicity, unspecified site  Prescribed allopurinol 300 mg, take 1 tab daily as prescribed. Potential side effects were discussed. Ordered labs to check uric acid level. Waiting for results. Uloric discontinued. -     URIC ACID; Future  -     allopurinoL (ZYLOPRIM) 300 mg tablet; Take 1 Tablet by mouth daily for 90 days. , Normal, Disp-90 Tablet, R-0 sent to pharmacy. 4. Hypertriglyceridemia  Continue taking Lipitor 20 mg and Tricor 145 mg as prescribed. Ordered labs to check lipid panel. Waiting for results. -     LIPID PANEL; Future    5. History of COVID-19  He has recovered well. SUBJECTIVE/OBJECTIVE:  HPI     Patient presents today for medication refills and labs. His BP today is 139/98, 138/86 manual repeat. He d/c his BP medication (Bystolic 5 mg). He checks his BP at home and notes his readings average around 125/75. He takes Lipitor 20 mg and Tricor 145 mg for hyperlipidemia. Earlier this year he started drinking more often. This causes his Uric acid level to rise and he has gout attacks in multiple joints. He cut back on the amount of alcohol he drinks.  He was prescribed Uloric 40 mg and prednisone 10 mg and has been taking these two medications x 2-3 months. He notes overeating and alcohol are his gout triggers. He has been using cannabis for his pain and notes that relieves his pain a decent amount, but it often causes him to over eat. He has not had a COVID-19 vaccine and he does not plan on receiving one. He had a COVID-19 infection in the past and has recovered well. Patient Active Problem List   Diagnosis Code    Hypertriglyceridemia E78.1    Idiopathic gout M10.00    Obesity (BMI 30-39. 9) E66.9    Essential hypertension I10    Severe obesity (HCC) E66.01    Right ureteral stone N20.1        Current Outpatient Medications on File Prior to Visit   Medication Sig Dispense Refill    omega-3 fatty acids-vitamin e 1,000 mg cap Take 1 Cap by mouth.  b complex vitamins tablet Take 1 Tab by mouth daily.  [DISCONTINUED] ULORIC 40 mg tab tablet TAKE 1 TABLET BY MOUTH EVERY DAY 90 Tab 0    [DISCONTINUED] predniSONE (STERAPRED DS) 10 mg dose pack See administration instruction per 10mg dose pack 21 Tab 0    hydrocortisone (HYTONE) 2.5 % topical cream Apply  to affected area two (2) times a day. use thin layer (Patient not taking: Reported on 11/1/2021) 30 g 0    [DISCONTINUED] cyclobenzaprine (FLEXERIL) 10 mg tablet TAKE 1 TABLET BY MOUTH NIGHTLY FOR 15 DAYS (Patient not taking: Reported on 11/1/2021) 15 Tab 0    [DISCONTINUED] oxyCODONE-acetaminophen (PERCOCET)  mg per tablet Take 1 Tab by mouth every six (6) hours as needed for Pain. Max Daily Amount: 4 Tabs. (Patient not taking: Reported on 11/1/2021) 12 Tab 0    [DISCONTINUED] ondansetron (ZOFRAN ODT) 4 mg disintegrating tablet Take 1 Tab by mouth every eight (8) hours as needed for Nausea. (Patient not taking: Reported on 11/1/2021) 12 Tab 0    tamsulosin (FLOMAX) 0.4 mg capsule Take 1 Cap by mouth daily.  (Patient not taking: Reported on 11/1/2021) 30 Cap 0    [DISCONTINUED] ketorolac (TORADOL) 10 mg tablet Take 1 Tab by mouth every six (6) hours as needed for Pain. (Patient not taking: Reported on 11/1/2021) 15 Tab 0    fenofibrate nanocrystallized (TRICOR) 145 mg tablet TAKE 1 TABLET BY ORAL ROUTE EVERY DAY (Patient not taking: Reported on 11/1/2021) 90 Tab 0    nebivolol (BYSTOLIC) 5 mg tablet Take 1 Tab by mouth daily. (Patient not taking: Reported on 11/1/2021) 90 Tab 2    atorvastatin (LIPITOR) 20 mg tablet Take 1 Tab by mouth daily. (Patient not taking: Reported on 11/1/2021) 90 Tab 2    [DISCONTINUED] esomeprazole (NEXIUM) 40 mg capsule Take  by mouth daily. (Patient not taking: Reported on 11/1/2021)       No current facility-administered medications on file prior to visit.        Allergies   Allergen Reactions    Pcn [Penicillins] Hives, Shortness of Breath and Swelling       Past Medical History:   Diagnosis Date    Flank pain 12/11/2018    Gout     Hypertension     Tachycardia     mild tachychardia with palpitations       Past Surgical History:   Procedure Laterality Date    HX SEPTOPLASTY         Family History   Problem Relation Age of Onset    Diabetes Mother     Cancer Mother     Allergy-severe Mother         severe allergy to statins       Social History     Socioeconomic History    Marital status: UNKNOWN     Spouse name: Not on file    Number of children: Not on file    Years of education: Not on file    Highest education level: Not on file   Occupational History    Not on file   Tobacco Use    Smoking status: Never Smoker    Smokeless tobacco: Never Used   Substance and Sexual Activity    Alcohol use: Yes     Comment: occationally    Drug use: No    Sexual activity: Yes     Partners: Female     Birth control/protection: Condom   Other Topics Concern    Not on file   Social History Narrative    Not on file     Social Determinants of Health     Financial Resource Strain:     Difficulty of Paying Living Expenses:    Food Insecurity:     Worried About Running Out of Food in the Last Year:     Tamra deluna Food in the Last Year:    Transportation Needs:     Lack of Transportation (Medical):  Lack of Transportation (Non-Medical):    Physical Activity:     Days of Exercise per Week:     Minutes of Exercise per Session:    Stress:     Feeling of Stress :    Social Connections:     Frequency of Communication with Friends and Family:     Frequency of Social Gatherings with Friends and Family:     Attends Islam Services:     Active Member of Clubs or Organizations:     Attends Club or Organization Meetings:     Marital Status:    Intimate Partner Violence:     Fear of Current or Ex-Partner:     Emotionally Abused:     Physically Abused:     Sexually Abused:        No visits with results within 3 Month(s) from this visit. Latest known visit with results is:   Admission on 12/12/2018, Discharged on 12/12/2018   Component Date Value Ref Range Status    Color 12/12/2018 YELLOW/STRAW    Final    Color Reference Range: Straw, Yellow or Dark Yellow    Appearance 12/12/2018 CLOUDY* CLEAR   Final    Specific gravity 12/12/2018 1.019  1.003 - 1.030   Final    pH (UA) 12/12/2018 6.5  5.0 - 8.0   Final    Protein 12/12/2018 NEGATIVE   NEG mg/dL Final    Glucose 12/12/2018 NEGATIVE   NEG mg/dL Final    Ketone 12/12/2018 NEGATIVE   NEG mg/dL Final    Bilirubin 12/12/2018 NEGATIVE   NEG   Final    Blood 12/12/2018 LARGE* NEG   Final    Urobilinogen 12/12/2018 1.0  0.2 - 1.0 EU/dL Final    Nitrites 12/12/2018 NEGATIVE   NEG   Final    Leukocyte Esterase 12/12/2018 NEGATIVE   NEG   Final    WBC 12/12/2018 0-4  0 - 4 /hpf Final    RBC 12/12/2018 >100* 0 - 5 /hpf Final    Epithelial cells 12/12/2018 FEW  FEW /lpf Final    Epithelial cell category consists of squamous cells and /or transitional urothelial cells. Renal tubular cells, if present, are separately identified as such.     Bacteria 12/12/2018 NEGATIVE   NEG /hpf Final    Hyaline cast 12/12/2018 0-2  0 - 5 /lpf Final    WBC 12/11/2018 7.1  4.1 - 11.1 K/uL Final    Comment: Due to mathematical rounding between the [address], and the new [employer] analyzers, the reported automated differential may vary by up to +/- 0.5% per cell line. This finding may produce a result that is 100% +/- 3%, which is clinically insignificant.  RBC 12/11/2018 4.27  4. 10 - 5.70 M/uL Final    HGB 12/11/2018 13.8  12.1 - 17.0 g/dL Final    HCT 12/11/2018 38.9  36.6 - 50.3 % Final    MCV 12/11/2018 91.1  80.0 - 99.0 FL Final    MCH 12/11/2018 32.3  26.0 - 34.0 PG Final    MCHC 12/11/2018 35.5  30.0 - 36.5 g/dL Final    RDW 12/11/2018 11.5  11.5 - 14.5 % Final    PLATELET 82/77/1085 750  150 - 400 K/uL Final    MPV 12/11/2018 10.0  8.9 - 12.9 FL Final    NRBC 12/11/2018 0.0  0  WBC Final    ABSOLUTE NRBC 12/11/2018 0.00  0.00 - 0.01 K/uL Final    NEUTROPHILS 12/11/2018 65  32 - 75 % Final    LYMPHOCYTES 12/11/2018 21  12 - 49 % Final    MONOCYTES 12/11/2018 10  5 - 13 % Final    EOSINOPHILS 12/11/2018 3  0 - 7 % Final    BASOPHILS 12/11/2018 1  0 - 1 % Final    IMMATURE GRANULOCYTES 12/11/2018 0  0.0 - 0.5 % Final    ABS. NEUTROPHILS 12/11/2018 4.6  1.8 - 8.0 K/UL Final    ABS. LYMPHOCYTES 12/11/2018 1.5  0.8 - 3.5 K/UL Final    ABS. MONOCYTES 12/11/2018 0.7  0.0 - 1.0 K/UL Final    ABS. EOSINOPHILS 12/11/2018 0.2  0.0 - 0.4 K/UL Final    ABS. BASOPHILS 12/11/2018 0.1  0.0 - 0.1 K/UL Final    ABS. IMM. GRANS.  12/11/2018 0.0  0.00 - 0.04 K/UL Final    DF 12/11/2018 AUTOMATED    Final    Sodium 12/11/2018 138  136 - 145 mmol/L Final    Potassium 12/11/2018 3.9  3.5 - 5.1 mmol/L Final    Chloride 12/11/2018 106  97 - 108 mmol/L Final    CO2 12/11/2018 23  21 - 32 mmol/L Final    Anion gap 12/11/2018 9  5 - 15 mmol/L Final    Glucose 12/11/2018 127* 65 - 100 mg/dL Final    BUN 12/11/2018 13  6 - 20 MG/DL Final    Creatinine 12/11/2018 1.56* 0.70 - 1.30 MG/DL Final    BUN/Creatinine ratio 12/11/2018 8* 12 - 20   Final    GFR est AA 12/11/2018 60* >60 ml/min/1.73m2 Final    GFR est non-AA 12/11/2018 49* >60 ml/min/1.73m2 Final    Comment: Estimated GFR is calculated using the IDMS-traceable Modification of Diet in Renal Disease (MDRD) Study equation, reported for both  Americans (GFRAA) and non- Americans (GFRNA), and normalized to 1.73m2 body surface area. The physician must decide which value applies to the patient. The MDRD study equation should only be used in individuals age 25 or older. It has not been validated for the following: pregnant women, patients with serious comorbid conditions, or on certain medications, or persons with extremes of body size, muscle mass, or nutritional status.  Calcium 12/11/2018 8.8  8.5 - 10.1 MG/DL Final    Bilirubin, total 12/11/2018 0.4  0.2 - 1.0 MG/DL Final    ALT (SGPT) 12/11/2018 67  12 - 78 U/L Final    AST (SGOT) 12/11/2018 39* 15 - 37 U/L Final    Alk. phosphatase 12/11/2018 41* 45 - 117 U/L Final    Protein, total 12/11/2018 7.5  6.4 - 8.2 g/dL Final    Albumin 12/11/2018 4.1  3.5 - 5.0 g/dL Final    Globulin 12/11/2018 3.4  2.0 - 4.0 g/dL Final    A-G Ratio 12/11/2018 1.2  1.1 - 2.2   Final       Review of Systems   Constitutional: Negative for activity change, fatigue and unexpected weight change. HENT: Negative for congestion, ear discharge, ear pain, hearing loss, rhinorrhea and sore throat. Eyes: Negative for pain, discharge and redness. Respiratory: Negative for cough, chest tightness and shortness of breath. Cardiovascular: Negative for leg swelling. Gastrointestinal: Negative for abdominal pain, constipation and diarrhea. Endocrine: Negative for polyuria. Genitourinary: Negative for dysuria, flank pain and urgency. Musculoskeletal: Positive for arthralgias (gout). Negative for back pain and myalgias. Skin: Negative for color change. Neurological: Negative for dizziness, light-headedness and headaches. Psychiatric/Behavioral: Negative for dysphoric mood and sleep disturbance. The patient is not nervous/anxious. Visit Vitals  /86 (BP 1 Location: Right arm)   Pulse 94   Temp 98 °F (36.7 °C) (Temporal)   Resp 18   Ht 6' 1\" (1.854 m)   Wt 260 lb (117.9 kg)   SpO2 97%   BMI 34.30 kg/m²       Physical Exam  Vitals and nursing note reviewed. Constitutional:       General: He is not in acute distress. Appearance: Normal appearance. He is well-developed. He is not diaphoretic. HENT:      Head: Normocephalic and atraumatic. Right Ear: External ear normal.      Left Ear: External ear normal.      Mouth/Throat:      Mouth: Mucous membranes are moist.      Pharynx: Oropharynx is clear. Eyes:      General: No scleral icterus. Right eye: No discharge. Left eye: No discharge. Extraocular Movements: Extraocular movements intact. Conjunctiva/sclera: Conjunctivae normal.      Pupils: Pupils are equal, round, and reactive to light. Cardiovascular:      Rate and Rhythm: Normal rate and regular rhythm. Pulses: Normal pulses. Heart sounds: Normal heart sounds. Pulmonary:      Effort: Pulmonary effort is normal.      Breath sounds: Normal breath sounds. No wheezing. Musculoskeletal:      Right lower leg: No edema. Left lower leg: No edema. Neurological:      Mental Status: He is alert and oriented to person, place, and time. Psychiatric:         Mood and Affect: Mood and affect normal.             An electronic signature was used to authenticate this note.   -- Blanco Clarke

## 2021-12-04 LAB — SARS-COV-2, NAA: NOT DETECTED

## 2021-12-27 DIAGNOSIS — M10.00 IDIOPATHIC GOUT, UNSPECIFIED CHRONICITY, UNSPECIFIED SITE: ICD-10-CM

## 2021-12-27 RX ORDER — ALLOPURINOL 300 MG/1
TABLET ORAL
Qty: 90 TABLET | Refills: 0 | Status: SHIPPED | OUTPATIENT
Start: 2021-12-27

## 2022-03-18 PROBLEM — I10 ESSENTIAL HYPERTENSION: Status: ACTIVE | Noted: 2018-04-04

## 2022-03-19 PROBLEM — E66.01 SEVERE OBESITY (HCC): Status: ACTIVE | Noted: 2018-11-14

## 2022-03-20 PROBLEM — E66.9 OBESITY (BMI 30-39.9): Status: ACTIVE | Noted: 2018-04-04

## 2022-03-20 PROBLEM — N20.1 RIGHT URETERAL STONE: Status: ACTIVE | Noted: 2018-11-14
